# Patient Record
Sex: FEMALE | Race: BLACK OR AFRICAN AMERICAN | NOT HISPANIC OR LATINO | Employment: UNEMPLOYED | URBAN - METROPOLITAN AREA
[De-identification: names, ages, dates, MRNs, and addresses within clinical notes are randomized per-mention and may not be internally consistent; named-entity substitution may affect disease eponyms.]

---

## 2018-05-10 ENCOUNTER — PATIENT OUTREACH (OUTPATIENT)
Dept: ADMINISTRATIVE | Facility: HOSPITAL | Age: 58
End: 2018-05-10

## 2018-05-10 NOTE — LETTER
May 10, 2018    Jenni Rush  [address] [address]             [facility] [address] [address]  Phone: [phone] Dear Ms. Rush     You have an upcoming appointment with Jorge Cody. on 05/25/2018    Your chart is indicating you may be overdue for the following:   Health Maintenance Due   Topic    Pap Smear with HPV Cotest     Mammogram     Lipid Panel     Colonoscopy      Were any of these test, procedures, and/or vaccines performed outside of [facility]?  If so, please let me know when and where so I may request those records and update your chart.    If you would like me to coordinate scheduling any of the recommended test or procedures around the time of your appointment, please feel free to let me know.     Thank you for choosing [facility] for your healthcare needs,    Dee MELARA LPN  Care Coordination Department  [facility]  [phone]

## 2018-05-24 ENCOUNTER — OFFICE VISIT (OUTPATIENT)
Dept: FAMILY MEDICINE | Facility: CLINIC | Age: 58
End: 2018-05-24
Payer: COMMERCIAL

## 2018-05-24 ENCOUNTER — LAB VISIT (OUTPATIENT)
Dept: LAB | Facility: HOSPITAL | Age: 58
End: 2018-05-24
Attending: FAMILY MEDICINE
Payer: COMMERCIAL

## 2018-05-24 VITALS
DIASTOLIC BLOOD PRESSURE: 80 MMHG | RESPIRATION RATE: 17 BRPM | HEIGHT: 62 IN | BODY MASS INDEX: 28.24 KG/M2 | HEART RATE: 94 BPM | OXYGEN SATURATION: 98 % | WEIGHT: 153.44 LBS | SYSTOLIC BLOOD PRESSURE: 146 MMHG | TEMPERATURE: 98 F

## 2018-05-24 DIAGNOSIS — F41.9 ANXIETY DISORDER, UNSPECIFIED TYPE: ICD-10-CM

## 2018-05-24 DIAGNOSIS — Z00.00 ANNUAL PHYSICAL EXAM: ICD-10-CM

## 2018-05-24 DIAGNOSIS — Z78.0 POSTMENOPAUSAL: ICD-10-CM

## 2018-05-24 DIAGNOSIS — Z12.11 COLON CANCER SCREENING: ICD-10-CM

## 2018-05-24 DIAGNOSIS — K59.09 CHRONIC CONSTIPATION: ICD-10-CM

## 2018-05-24 DIAGNOSIS — Z00.00 ANNUAL PHYSICAL EXAM: Primary | ICD-10-CM

## 2018-05-24 DIAGNOSIS — H91.90 DECREASED HEARING, UNSPECIFIED LATERALITY: ICD-10-CM

## 2018-05-24 DIAGNOSIS — R03.0 ELEVATED BLOOD-PRESSURE READING, WITHOUT DIAGNOSIS OF HYPERTENSION: ICD-10-CM

## 2018-05-24 DIAGNOSIS — Z12.31 VISIT FOR SCREENING MAMMOGRAM: ICD-10-CM

## 2018-05-24 LAB
ALBUMIN SERPL BCP-MCNC: 4 G/DL
ALP SERPL-CCNC: 114 U/L
ALT SERPL W/O P-5'-P-CCNC: 14 U/L
ANION GAP SERPL CALC-SCNC: 9 MMOL/L
AST SERPL-CCNC: 20 U/L
BASOPHILS # BLD AUTO: 0.03 K/UL
BASOPHILS NFR BLD: 0.6 %
BILIRUB SERPL-MCNC: 0.3 MG/DL
BILIRUB UR QL STRIP: NEGATIVE
BUN SERPL-MCNC: 13 MG/DL
CALCIUM SERPL-MCNC: 10.5 MG/DL
CHLORIDE SERPL-SCNC: 104 MMOL/L
CHOLEST SERPL-MCNC: 243 MG/DL
CHOLEST/HDLC SERPL: 3.7 {RATIO}
CLARITY UR REFRACT.AUTO: CLEAR
CO2 SERPL-SCNC: 28 MMOL/L
COLOR UR AUTO: YELLOW
CREAT SERPL-MCNC: 0.9 MG/DL
DIFFERENTIAL METHOD: ABNORMAL
EOSINOPHIL # BLD AUTO: 0.1 K/UL
EOSINOPHIL NFR BLD: 2.9 %
ERYTHROCYTE [DISTWIDTH] IN BLOOD BY AUTOMATED COUNT: 13.2 %
EST. GFR  (AFRICAN AMERICAN): >60 ML/MIN/1.73 M^2
EST. GFR  (NON AFRICAN AMERICAN): >60 ML/MIN/1.73 M^2
GLUCOSE SERPL-MCNC: 96 MG/DL
GLUCOSE UR QL STRIP: NEGATIVE
HCT VFR BLD AUTO: 42.7 %
HDLC SERPL-MCNC: 65 MG/DL
HDLC SERPL: 26.7 %
HGB BLD-MCNC: 13 G/DL
HGB UR QL STRIP: NEGATIVE
IMM GRANULOCYTES # BLD AUTO: 0.01 K/UL
IMM GRANULOCYTES NFR BLD AUTO: 0.2 %
KETONES UR QL STRIP: NEGATIVE
LDLC SERPL CALC-MCNC: 156 MG/DL
LEUKOCYTE ESTERASE UR QL STRIP: NEGATIVE
LYMPHOCYTES # BLD AUTO: 1.9 K/UL
LYMPHOCYTES NFR BLD: 39.4 %
MCH RBC QN AUTO: 26.9 PG
MCHC RBC AUTO-ENTMCNC: 30.4 G/DL
MCV RBC AUTO: 88 FL
MICROSCOPIC COMMENT: NORMAL
MONOCYTES # BLD AUTO: 0.4 K/UL
MONOCYTES NFR BLD: 8.4 %
NEUTROPHILS # BLD AUTO: 2.3 K/UL
NEUTROPHILS NFR BLD: 48.5 %
NITRITE UR QL STRIP: NEGATIVE
NONHDLC SERPL-MCNC: 178 MG/DL
NRBC BLD-RTO: 0 /100 WBC
PH UR STRIP: 5 [PH] (ref 5–8)
PLATELET # BLD AUTO: 414 K/UL
PMV BLD AUTO: 9.8 FL
POTASSIUM SERPL-SCNC: 4.1 MMOL/L
PROT SERPL-MCNC: 8.3 G/DL
PROT UR QL STRIP: NEGATIVE
RBC # BLD AUTO: 4.83 M/UL
RBC #/AREA URNS AUTO: 2 /HPF (ref 0–4)
SODIUM SERPL-SCNC: 141 MMOL/L
SP GR UR STRIP: 1.02 (ref 1–1.03)
SQUAMOUS #/AREA URNS AUTO: 0 /HPF
TRIGL SERPL-MCNC: 110 MG/DL
TSH SERPL DL<=0.005 MIU/L-ACNC: 2.65 UIU/ML
URN SPEC COLLECT METH UR: NORMAL
UROBILINOGEN UR STRIP-ACNC: NEGATIVE EU/DL
WBC # BLD AUTO: 4.75 K/UL

## 2018-05-24 PROCEDURE — 99999 PR PBB SHADOW E&M-EST. PATIENT-LVL V: CPT | Mod: PBBFAC,,, | Performed by: FAMILY MEDICINE

## 2018-05-24 PROCEDURE — 81001 URINALYSIS AUTO W/SCOPE: CPT

## 2018-05-24 PROCEDURE — 36415 COLL VENOUS BLD VENIPUNCTURE: CPT | Mod: PO

## 2018-05-24 PROCEDURE — 99386 PREV VISIT NEW AGE 40-64: CPT | Mod: S$GLB,,, | Performed by: FAMILY MEDICINE

## 2018-05-24 PROCEDURE — 85025 COMPLETE CBC W/AUTO DIFF WBC: CPT

## 2018-05-24 PROCEDURE — 84443 ASSAY THYROID STIM HORMONE: CPT

## 2018-05-24 PROCEDURE — 80061 LIPID PANEL: CPT

## 2018-05-24 PROCEDURE — 80053 COMPREHEN METABOLIC PANEL: CPT

## 2018-05-24 RX ORDER — ESCITALOPRAM OXALATE 10 MG/1
10 TABLET ORAL DAILY
Qty: 90 TABLET | Refills: 1 | Status: SHIPPED | OUTPATIENT
Start: 2018-05-24 | End: 2020-03-26

## 2018-05-24 NOTE — LETTER
May 24, 2018      [facility]  [address] [address]  Phone: [phone]       Patient: Jenni Rush   YOB: 1960  Date of Visit: 05/24/2018    To Whom It May Concern:    Carly Rush  was at [facility] on 05/24/2018.She may return to work on 05/25/2018 with no restrictions. If you have any questions or concerns, or if I can be of further assistance, please do not hesitate to contact me.    Sincerely,    Theresa Patel MD

## 2018-05-26 PROBLEM — H91.90 DECREASED HEARING: Status: ACTIVE | Noted: 2018-05-26

## 2018-05-26 PROBLEM — R03.0 ELEVATED BLOOD-PRESSURE READING, WITHOUT DIAGNOSIS OF HYPERTENSION: Status: ACTIVE | Noted: 2018-05-26

## 2018-05-28 ENCOUNTER — CLINICAL SUPPORT (OUTPATIENT)
Dept: AUDIOLOGY | Facility: CLINIC | Age: 58
End: 2018-05-28
Payer: COMMERCIAL

## 2018-05-28 DIAGNOSIS — H91.90 PERCEIVED HEARING LOSS: Primary | ICD-10-CM

## 2018-05-28 DIAGNOSIS — H91.90 DECREASED HEARING, UNSPECIFIED LATERALITY: ICD-10-CM

## 2018-05-28 PROCEDURE — 92557 COMPREHENSIVE HEARING TEST: CPT | Mod: S$GLB,,, | Performed by: AUDIOLOGIST

## 2018-05-28 PROCEDURE — 92567 TYMPANOMETRY: CPT | Mod: S$GLB,,, | Performed by: AUDIOLOGIST

## 2018-05-28 NOTE — PROGRESS NOTES
Jenni Rush was seen 05/28/2018 for an audiological evaluation.  Patient complains of having to ask people to repeat during conversations.  She denies tinnitus and dizziness.    Results reveal normal hearing sensitivity 250-8000 Hz bilaterally.  Speech Reception Thresholds were  5 dBHL for the right ear and 10 dBHL for the left ear.   Word recognition scores were excellent bilaterally.  Tympanograms were Type A for the right ear and Type A for the left ear.    Patient was counseled on the above findings.

## 2018-06-05 ENCOUNTER — INITIAL CONSULT (OUTPATIENT)
Dept: GASTROENTEROLOGY | Facility: CLINIC | Age: 58
End: 2018-06-05
Payer: COMMERCIAL

## 2018-06-05 VITALS
DIASTOLIC BLOOD PRESSURE: 94 MMHG | HEART RATE: 84 BPM | BODY MASS INDEX: 29.01 KG/M2 | HEIGHT: 62 IN | WEIGHT: 157.63 LBS | SYSTOLIC BLOOD PRESSURE: 160 MMHG

## 2018-06-05 DIAGNOSIS — Z12.11 SCREENING FOR COLON CANCER: ICD-10-CM

## 2018-06-05 DIAGNOSIS — K59.04 CHRONIC IDIOPATHIC CONSTIPATION: Primary | ICD-10-CM

## 2018-06-05 PROCEDURE — 3008F BODY MASS INDEX DOCD: CPT | Mod: CPTII,S$GLB,, | Performed by: NURSE PRACTITIONER

## 2018-06-05 PROCEDURE — 99999 PR PBB SHADOW E&M-EST. PATIENT-LVL III: CPT | Mod: PBBFAC,,, | Performed by: NURSE PRACTITIONER

## 2018-06-05 PROCEDURE — 99204 OFFICE O/P NEW MOD 45 MIN: CPT | Mod: S$GLB,,, | Performed by: NURSE PRACTITIONER

## 2018-06-05 RX ORDER — MULTIVITAMIN
TABLET ORAL ONCE
COMMUNITY
End: 2020-03-26

## 2018-06-05 RX ORDER — DOCUSATE SODIUM 100 MG/1
100 CAPSULE, LIQUID FILLED ORAL 2 TIMES DAILY
COMMUNITY
End: 2018-08-24

## 2018-06-05 RX ORDER — SODIUM, POTASSIUM,MAG SULFATES 17.5-3.13G
1 SOLUTION, RECONSTITUTED, ORAL ORAL ONCE
Qty: 1 BOTTLE | Refills: 0 | Status: SHIPPED | OUTPATIENT
Start: 2018-06-05 | End: 2018-06-05

## 2018-06-05 RX ORDER — METHOCARBAMOL 750 MG/1
500 TABLET, FILM COATED ORAL 2 TIMES DAILY PRN
COMMUNITY
End: 2018-07-26

## 2018-06-05 RX ORDER — VIT C/E/ZN/COPPR/LUTEIN/ZEAXAN 250MG-90MG
1000 CAPSULE ORAL DAILY
COMMUNITY
End: 2020-03-26

## 2018-06-05 RX ORDER — IBUPROFEN 600 MG/1
600 TABLET ORAL 2 TIMES DAILY
COMMUNITY
End: 2018-08-24

## 2018-06-05 RX ORDER — VALACYCLOVIR HYDROCHLORIDE 1 G/1
1000 TABLET, FILM COATED ORAL 2 TIMES DAILY
COMMUNITY
End: 2020-03-26

## 2018-06-05 RX ORDER — POLYETHYLENE GLYCOL 3350 17 G/17G
17 POWDER, FOR SOLUTION ORAL DAILY
COMMUNITY
End: 2018-08-24

## 2018-06-05 RX ORDER — MINOCYCLINE HYDROCHLORIDE 50 MG/1
100 CAPSULE ORAL
COMMUNITY
End: 2020-03-26

## 2018-06-05 RX ORDER — LUBIPROSTONE 24 UG/1
24 CAPSULE ORAL 2 TIMES DAILY WITH MEALS
Qty: 60 CAPSULE | Refills: 2 | Status: SHIPPED | OUTPATIENT
Start: 2018-06-05 | End: 2020-03-26

## 2018-06-05 NOTE — PROGRESS NOTES
Clinic Consult:  [facility] Consultation Note    Reason for Consult:  The primary encounter diagnosis was Chronic idiopathic constipation. A diagnosis of Screening for colon cancer was also pertinent to this visit.    PCP: Primary Doctor No   [address] [place] [address]    HPI:  This is a 57 y.o. female here for evaluation of the above  Pt reports a history of chronic constipation since childhood.  The constipation has recently worsened. She has been taking OTC enemas with Miralax and stool softeners without any significant relief.  Has a BM once weekly even with taking these medications.  Has tried OTC mag citrate without improvement.   She has occasional abdominal pain with the constipation.  Has associated bloating.   No melena or hematochezia.  No weight loss.   Has never had a colonoscopy for screening.     Review of Systems   Constitutional: Negative for chills, fever, malaise/fatigue and weight loss.   Respiratory: Negative for cough.    Cardiovascular: Negative for chest pain.   Gastrointestinal:        Per HPI   Musculoskeletal: Negative for myalgias.   Skin: Negative for itching and rash.   Neurological: Negative for headaches.   Psychiatric/Behavioral: The patient is not nervous/anxious.        Medical History:   Past Medical History:   Diagnosis Date    Elevated blood pressure reading without diagnosis of hypertension     History of abnormal cervical Pap smear     x 1 with repeat normal - thus follow routine pap screening    Postmenopausal        Surgical History:  Past Surgical History:   Procedure Laterality Date    None         Family History:   Family History   Problem Relation Age of Onset    Hypertension Mother     Anxiety disorder Mother     Hyperlipidemia Mother     Heart attack Father     Coronary artery disease Brother     Stomach cancer Paternal Grandfather     No Known Problems Brother     Anxiety disorder Daughter     No Known Problems Son      "Diabetes Paternal Aunt     Thyroid disease Neg Hx        Social History:   Social History   Substance Use Topics    Smoking status: Never Smoker    Smokeless tobacco: Never Used    Alcohol use 0.6 oz/week     1 Glasses of wine per week       Allergies: Reviewed    Home Medications:   Current Outpatient Prescriptions on File Prior to Visit   Medication Sig Dispense Refill    escitalopram oxalate (LEXAPRO) 10 MG tablet Take 1 tablet (10 mg total) by mouth once daily. 90 tablet 1     No current facility-administered medications on file prior to visit.        Physical Exam:  Vital Signs:  BP (!) 160/94   Pulse 84   Ht 5' 2" (1.575 m)   Wt 71.5 kg (157 lb 10.1 oz)   BMI 28.83 kg/m²   Body mass index is 28.83 kg/m².  Physical Exam   Constitutional: She is oriented to person, place, and time. She appears well-developed and well-nourished.   HENT:   Head: Normocephalic.   Eyes: No scleral icterus.   Neck: Normal range of motion.   Cardiovascular: Normal rate and regular rhythm.    Pulmonary/Chest: Effort normal and breath sounds normal.   Abdominal: Soft. Bowel sounds are normal. She exhibits no distension. There is no tenderness.   Musculoskeletal: Normal range of motion.   Neurological: She is alert and oriented to person, place, and time.   Skin: Skin is warm and dry.   Psychiatric: She has a normal mood and affect.   Vitals reviewed.      Labs: Pertinent labs reviewed.      Assessment:  1. Chronic idiopathic constipation    2. Screening for colon cancer         Recommendations:  Chronic idiopathic constipation  - increase water and dietary fiber  - Trial of Amitiza BID for the constipation given that she has tried multiple OTC medication without improvement.   -     lubiprostone (AMITIZA) 24 MCG Cap; Take 1 capsule (24 mcg total) by mouth 2 (two) times daily with meals.  Dispense: 60 capsule; Refill: 2    Screening for colon cancer  - Plan for colonoscopy with Suprep  - Encouraged 2 days of clear liquids for " better prep  -     Case request GI: COLONOSCOPY  -     sodium,potassium,mag sulfates (SUPREP BOWEL PREP KIT) 17.5-3.13-1.6 gram SolR; Take 354 mLs by mouth once.  Dispense: 1 Bottle; Refill: 0          F/U 2-3 weeks after colonoscopy       Thank you so much for allowing me to participate in the care of Jenni Mendez, FNP-C

## 2018-06-05 NOTE — LETTER
June 5, 2018      Theresa Patel MD  [address] [place] [address]           [facility]  [address] [place] [address]  Phone: [phone]  Fax: [phone]          Patient: Jenni Rush   MR Number: [id number]   YOB: 1960   Date of Visit: 6/5/2018       Dear Dr. Theresa Patel:    Thank you for referring Jenni Rush to me for evaluation. Attached you will find relevant portions of my assessment and plan of care.    If you have questions, please do not hesitate to call me. I look forward to following Jenni Rush along with you.    Sincerely,    Janet Mendez, [facility]    Enclosure  CC:  No Recipients    If you would like to receive this communication electronically, please contact [email or web address] or [phone] to request more information on [facility] [employer] access.    For providers and/or their staff who would like to refer a patient to [facility], please contact us through our one-stop-shop provider referral line, [facility], at [phone].    If you feel you have received this communication in error or would no longer like to receive these types of communications, please e-mail [email or web address]

## 2018-06-12 ENCOUNTER — SURGERY (OUTPATIENT)
Age: 58
End: 2018-06-12

## 2018-06-12 ENCOUNTER — HOSPITAL ENCOUNTER (OUTPATIENT)
Facility: HOSPITAL | Age: 58
Discharge: HOME | End: 2018-06-12
Attending: INTERNAL MEDICINE | Admitting: INTERNAL MEDICINE
Payer: COMMERCIAL

## 2018-06-12 ENCOUNTER — ANESTHESIA EVENT (OUTPATIENT)
Dept: ENDOSCOPY | Facility: HOSPITAL | Age: 58
End: 2018-06-12
Payer: COMMERCIAL

## 2018-06-12 ENCOUNTER — ANESTHESIA (OUTPATIENT)
Dept: ENDOSCOPY | Facility: HOSPITAL | Age: 58
End: 2018-06-12
Payer: COMMERCIAL

## 2018-06-12 VITALS
HEIGHT: 62 IN | WEIGHT: 153 LBS | HEART RATE: 78 BPM | SYSTOLIC BLOOD PRESSURE: 119 MMHG | BODY MASS INDEX: 28.16 KG/M2 | DIASTOLIC BLOOD PRESSURE: 73 MMHG | OXYGEN SATURATION: 100 % | RESPIRATION RATE: 18 BRPM | TEMPERATURE: 98 F

## 2018-06-12 DIAGNOSIS — Z12.11 COLON CANCER SCREENING: ICD-10-CM

## 2018-06-12 PROCEDURE — 37000008 HC ANESTHESIA 1ST 15 MINUTES: Performed by: INTERNAL MEDICINE

## 2018-06-12 PROCEDURE — 37000009 HC ANESTHESIA EA ADD 15 MINS: Performed by: INTERNAL MEDICINE

## 2018-06-12 PROCEDURE — G0121 COLON CA SCRN NOT HI RSK IND: HCPCS | Performed by: INTERNAL MEDICINE

## 2018-06-12 PROCEDURE — G0121 COLON CA SCRN NOT HI RSK IND: HCPCS | Mod: ,,, | Performed by: INTERNAL MEDICINE

## 2018-06-12 PROCEDURE — 25000003 PHARM REV CODE 250: Performed by: NURSE ANESTHETIST, CERTIFIED REGISTERED

## 2018-06-12 PROCEDURE — 63600175 PHARM REV CODE 636 W HCPCS: Performed by: NURSE ANESTHETIST, CERTIFIED REGISTERED

## 2018-06-12 PROCEDURE — 25000003 PHARM REV CODE 250: Performed by: INTERNAL MEDICINE

## 2018-06-12 RX ORDER — LIDOCAINE HYDROCHLORIDE 10 MG/ML
INJECTION INFILTRATION; PERINEURAL
Status: DISCONTINUED | OUTPATIENT
Start: 2018-06-12 | End: 2018-06-12

## 2018-06-12 RX ORDER — SODIUM CHLORIDE, SODIUM LACTATE, POTASSIUM CHLORIDE, CALCIUM CHLORIDE 600; 310; 30; 20 MG/100ML; MG/100ML; MG/100ML; MG/100ML
INJECTION, SOLUTION INTRAVENOUS CONTINUOUS
Status: DISCONTINUED | OUTPATIENT
Start: 2018-06-12 | End: 2018-06-12 | Stop reason: HOSPADM

## 2018-06-12 RX ORDER — PROPOFOL 10 MG/ML
VIAL (ML) INTRAVENOUS
Status: DISCONTINUED | OUTPATIENT
Start: 2018-06-12 | End: 2018-06-12

## 2018-06-12 RX ADMIN — PROPOFOL 50 MG: 10 INJECTION, EMULSION INTRAVENOUS at 08:06

## 2018-06-12 RX ADMIN — LIDOCAINE HYDROCHLORIDE 50 MG: 10 INJECTION, SOLUTION INFILTRATION; PERINEURAL at 08:06

## 2018-06-12 RX ADMIN — PROPOFOL 25 MG: 10 INJECTION, EMULSION INTRAVENOUS at 08:06

## 2018-06-12 RX ADMIN — SODIUM CHLORIDE, SODIUM LACTATE, POTASSIUM CHLORIDE, AND CALCIUM CHLORIDE: .6; .31; .03; .02 INJECTION, SOLUTION INTRAVENOUS at 07:06

## 2018-06-12 NOTE — ANESTHESIA POSTPROCEDURE EVALUATION
"Anesthesia Post Evaluation    Patient: Jenni Rush    Procedure(s) Performed: Procedure(s) (LRB):  COLONOSCOPY (N/A)    Final Anesthesia Type: MAC  Patient location during evaluation: GI PACU  Patient participation: Yes- Able to Participate  Level of consciousness: awake and alert and oriented  Post-procedure vital signs: reviewed and stable  Pain management: adequate  Airway patency: patent  PONV status at discharge: No PONV  Anesthetic complications: no      Cardiovascular status: blood pressure returned to baseline  Respiratory status: unassisted, room air and spontaneous ventilation  Hydration status: euvolemic  Follow-up not needed.        Visit Vitals  /73 (BP Location: Left arm, Patient Position: Lying)   Pulse 78   Temp 36.5 °C (97.7 °F) (Oral)   Resp 18   Ht 5' 2" (1.575 m)   Wt 69.4 kg (153 lb)   SpO2 100%   Breastfeeding? No   BMI 27.98 kg/m²       Pain/Ant Score: Pain Assessment Performed: Yes (6/12/2018  7:21 AM)  Presence of Pain: denies (6/12/2018  8:57 AM)  Ant Score: 10 (6/12/2018  8:57 AM)      "

## 2018-06-12 NOTE — INTERVAL H&P NOTE
The patient has been examined and the H&P has been reviewed:I have reviewed this note and I agree with this assessment. The patient was seen in the GI office and remains stable for endoscopy at the time of this present evaluation.         Anesthesia/Surgery risks, benefits and alternative options discussed and understood by patient/family.          Active Hospital Problems    Diagnosis  POA    Colon cancer screening [Z12.11]  Not Applicable      Resolved Hospital Problems    Diagnosis Date Resolved POA   No resolved problems to display.

## 2018-06-12 NOTE — DISCHARGE INSTRUCTIONS
Colonoscopy     A camera attached to a flexible tube with a viewing lens is used to take video pictures.     Colonoscopy is a test to view the inside of your lower digestive tract (colon and rectum). Sometimes it can show the last part of the small intestine (ileum). During the test, small pieces of tissue may be removed for testing. This is called a biopsy. Small growths, such as polyps, may also be removed.   Why is colonoscopy done?  The test is done to help look for colon cancer. And it can help find the source of abdominal pain, bleeding, and changes in bowel habits. It may be needed once a year, depending on factors such as your:  · Age  · Health history  · Family health history  · Symptoms  · Results from any prior colonoscopy  Risks and possible complications  These include:  · Bleeding               · A puncture or tear in the colon   · Risks of anesthesia  · A cancer lesion not being seen  Getting ready   To prepare for the test:  · Talk with your healthcare provider about the risks of the test (see below). Also ask your healthcare provider about alternatives to the test.  · Tell your healthcare provider about any medicines you take. Also tell him or her about any health conditions you may have.  · Make sure your rectum and colon are empty for the test. Follow the diet and bowel prep instructions exactly. If you dont, the test may need to be rescheduled.  · Plan for a friend or family member to drive you home after the test.     Colonoscopy provides an inside view of the entire colon.     You may discuss the results with your doctor right away or at a future visit.  During the test   The test is usually done in the hospital on an outpatient basis. This means you go home the same day. The procedure takes about 30 minutes. During that time:  · You are given relaxing (sedating) medicine through an IV line. You may be drowsy, or fully asleep.  · The healthcare provider will first give you a physical exam to  check for anal and rectal problems.  · Then the anus is lubricated and the scope inserted.  · If you are awake, you may have a feeling similar to needing to have a bowel movement. You may also feel pressure as air is pumped into the colon. Its OK to pass gas during the procedure.  · Biopsy, polyp removal, or other treatments may be done during the test.  After the test   You may have gas right after the test. It can help to try to pass it to help prevent later bloating. Your healthcare provider may discuss the results with you right away. Or you may need to schedule a follow-up visit to talk about the results. After the test, you can go back to your normal eating and other activities. You may be tired from the sedation and need to rest for a few hours.  Date Last Reviewed: 11/1/2016 © 2000-2017 [employer]. [address] [place] [address], [place] [address]. All rights reserved. This information is not intended as a substitute for professional medical care. Always follow your healthcare professional's instructions.

## 2018-06-12 NOTE — DISCHARGE SUMMARY
[facility]  Brief Operative Note     SUMMARY     Surgery Date: 6/12/2018     Surgeon(s) and Role:     * Carl Mora III, MD - Primary    Assisting Surgeon: None    Pre-op Diagnosis:  Screening for colon cancer [Z12.11]    Post-op Diagnosis:  Post-Op Diagnosis Codes:     * Screening for colon cancer [Z12.11]    Procedure(s) (LRB):  COLONOSCOPY (N/A)    Anesthesia: Choice    Description of the findings of the procedure: Procedures completed. See Procedure note for full details.    Findings/Key Components: Procedures completed. See Procedure note for full details.    Prosthetics/Devices: None    Estimated Blood Loss: * No values recorded between 6/12/2018 12:00 AM and 6/12/2018  8:50 AM *         Specimens:   Specimen (12h ago through future)    None          Discharge Note    SUMMARY     Admit Date: 6/12/2018    Discharge Date and Time: 6/12/2018    Hospital Course (synopsis of major diagnoses, care, treatment, and services provided during the course of the hospital stay):  Procedures completed. See Procedure note for full details. Discharge patient when discharge criteria met.    Final Diagnosis: Post-Op Diagnosis Codes:     * Screening for colon cancer [Z12.11]    Disposition: Discharge patient when discharge criteria met.    Follow Up/Patient Instructions:       Medications:  Reconciled Home Medications: Current Discharge Medication List      CONTINUE these medications which have NOT CHANGED    Details   calcium-vitamin D (CALCIUM WITH VITAMIN D) 600 mg(1,500mg) -400 unit Tab Take by mouth once.      cholecalciferol, vitamin D3, (VITAMIN D3) 1,000 unit capsule Take 1,000 Units by mouth once daily.      docusate sodium (COLACE) 100 MG capsule Take 100 mg by mouth 2 (two) times daily.      escitalopram oxalate (LEXAPRO) 10 MG tablet Take 1 tablet (10 mg total) by mouth once daily.  Qty: 90 tablet, Refills: 1    Associated Diagnoses: Anxiety disorder, unspecified type      ibuprofen  (ADVIL,MOTRIN) 600 MG tablet Take 600 mg by mouth 2 (two) times daily.      Lactobacillus rhamnosus GG (CULTURELLE) 10 billion cell capsule Take 1 capsule by mouth once daily.      methocarbamol (ROBAXIN) 750 MG Tab Take 500 mg by mouth 2 (two) times daily as needed.      minocycline (MINOCIN,DYNACIN) 50 MG Cap Take 100 mg by mouth every 12 (twelve) hours.      multivit/iron/FA/K/herb no.244 (ALIVE WOMEN'S ENERGY ORAL) Take by mouth.      polyethylene glycol (GLYCOLAX) 17 gram/dose powder Take 17 g by mouth once daily.      UNABLE TO FIND medication name: Daily Essential Enzymes OTC- 1 daily      lubiprostone (AMITIZA) 24 MCG Cap Take 1 capsule (24 mcg total) by mouth 2 (two) times daily with meals.  Qty: 60 capsule, Refills: 2    Associated Diagnoses: Chronic idiopathic constipation      valACYclovir (VALTREX) 1000 MG tablet Take 1,000 mg by mouth 2 (two) times daily.              Discharge Procedure Orders  Diet general     Diet general     Activity as tolerated     Activity as tolerated

## 2018-06-12 NOTE — H&P (VIEW-ONLY)
Clinic Consult:  [facility] Consultation Note    Reason for Consult:  The primary encounter diagnosis was Chronic idiopathic constipation. A diagnosis of Screening for colon cancer was also pertinent to this visit.    PCP: Primary Doctor No   [address] [place] [address]    HPI:  This is a 57 y.o. female here for evaluation of the above  Pt reports a history of chronic constipation since childhood.  The constipation has recently worsened. She has been taking OTC enemas with Miralax and stool softeners without any significant relief.  Has a BM once weekly even with taking these medications.  Has tried OTC mag citrate without improvement.   She has occasional abdominal pain with the constipation.  Has associated bloating.   No melena or hematochezia.  No weight loss.   Has never had a colonoscopy for screening.     Review of Systems   Constitutional: Negative for chills, fever, malaise/fatigue and weight loss.   Respiratory: Negative for cough.    Cardiovascular: Negative for chest pain.   Gastrointestinal:        Per HPI   Musculoskeletal: Negative for myalgias.   Skin: Negative for itching and rash.   Neurological: Negative for headaches.   Psychiatric/Behavioral: The patient is not nervous/anxious.        Medical History:   Past Medical History:   Diagnosis Date    Elevated blood pressure reading without diagnosis of hypertension     History of abnormal cervical Pap smear     x 1 with repeat normal - thus follow routine pap screening    Postmenopausal        Surgical History:  Past Surgical History:   Procedure Laterality Date    None         Family History:   Family History   Problem Relation Age of Onset    Hypertension Mother     Anxiety disorder Mother     Hyperlipidemia Mother     Heart attack Father     Coronary artery disease Brother     Stomach cancer Paternal Grandfather     No Known Problems Brother     Anxiety disorder Daughter     No Known Problems Son      "Diabetes Paternal Aunt     Thyroid disease Neg Hx        Social History:   Social History   Substance Use Topics    Smoking status: Never Smoker    Smokeless tobacco: Never Used    Alcohol use 0.6 oz/week     1 Glasses of wine per week       Allergies: Reviewed    Home Medications:   Current Outpatient Prescriptions on File Prior to Visit   Medication Sig Dispense Refill    escitalopram oxalate (LEXAPRO) 10 MG tablet Take 1 tablet (10 mg total) by mouth once daily. 90 tablet 1     No current facility-administered medications on file prior to visit.        Physical Exam:  Vital Signs:  BP (!) 160/94   Pulse 84   Ht 5' 2" (1.575 m)   Wt 71.5 kg (157 lb 10.1 oz)   BMI 28.83 kg/m²   Body mass index is 28.83 kg/m².  Physical Exam   Constitutional: She is oriented to person, place, and time. She appears well-developed and well-nourished.   HENT:   Head: Normocephalic.   Eyes: No scleral icterus.   Neck: Normal range of motion.   Cardiovascular: Normal rate and regular rhythm.    Pulmonary/Chest: Effort normal and breath sounds normal.   Abdominal: Soft. Bowel sounds are normal. She exhibits no distension. There is no tenderness.   Musculoskeletal: Normal range of motion.   Neurological: She is alert and oriented to person, place, and time.   Skin: Skin is warm and dry.   Psychiatric: She has a normal mood and affect.   Vitals reviewed.      Labs: Pertinent labs reviewed.      Assessment:  1. Chronic idiopathic constipation    2. Screening for colon cancer         Recommendations:  Chronic idiopathic constipation  - increase water and dietary fiber  - Trial of Amitiza BID for the constipation given that she has tried multiple OTC medication without improvement.   -     lubiprostone (AMITIZA) 24 MCG Cap; Take 1 capsule (24 mcg total) by mouth 2 (two) times daily with meals.  Dispense: 60 capsule; Refill: 2    Screening for colon cancer  - Plan for colonoscopy with Suprep  - Encouraged 2 days of clear liquids for " better prep  -     Case request GI: COLONOSCOPY  -     sodium,potassium,mag sulfates (SUPREP BOWEL PREP KIT) 17.5-3.13-1.6 gram SolR; Take 354 mLs by mouth once.  Dispense: 1 Bottle; Refill: 0          F/U 2-3 weeks after colonoscopy       Thank you so much for allowing me to participate in the care of Jenni Mendez, FNP-C

## 2018-06-12 NOTE — TRANSFER OF CARE
"Anesthesia Transfer of Care Note    Patient: Jenni Rush    Procedure(s) Performed: Procedure(s) (LRB):  COLONOSCOPY (N/A)    Patient location: GI    Anesthesia Type: MAC    Transport from OR: Transported from OR on room air with adequate spontaneous ventilation    Post pain: adequate analgesia    Post assessment: no apparent anesthetic complications    Post vital signs: stable    Level of consciousness: awake, alert and oriented    Nausea/Vomiting: no nausea/vomiting    Complications: none    Transfer of care protocol was followed      Last vitals:   Visit Vitals  /78 (BP Location: Left arm, Patient Position: Lying)   Pulse 92   Temp 36.5 °C (97.7 °F) (Oral)   Resp 18   Ht 5' 2" (1.575 m)   Wt 69.4 kg (153 lb)   SpO2 100%   Breastfeeding? No   BMI 27.98 kg/m²     "

## 2018-06-12 NOTE — ANESTHESIA RELEASE NOTE
"Anesthesia Release from PACU Note    Patient: Jenni Rush    Procedure(s) Performed: Procedure(s) (LRB):  COLONOSCOPY (N/A)    Anesthesia type: MAC    Post pain: Adequate analgesia    Post assessment: no apparent anesthetic complications, tolerated procedure well and no evidence of recall    Last Vitals:   Visit Vitals  /73 (BP Location: Left arm, Patient Position: Lying)   Pulse 78   Temp 36.5 °C (97.7 °F) (Oral)   Resp 18   Ht 5' 2" (1.575 m)   Wt 69.4 kg (153 lb)   SpO2 100%   Breastfeeding? No   BMI 27.98 kg/m²       Post vital signs: stable    Level of consciousness: awake, alert  and oriented    Nausea/Vomiting: no nausea/no vomiting    Complications: none    Airway Patency: patent    Respiratory: unassisted, spontaneous ventilation, room air    Cardiovascular: stable and blood pressure at baseline    Hydration: euvolemic  "

## 2018-06-18 ENCOUNTER — HOSPITAL ENCOUNTER (OUTPATIENT)
Dept: RADIOLOGY | Facility: HOSPITAL | Age: 58
Discharge: HOME | End: 2018-06-18
Attending: FAMILY MEDICINE
Payer: COMMERCIAL

## 2018-06-18 VITALS — BODY MASS INDEX: 28.16 KG/M2 | HEIGHT: 62 IN | WEIGHT: 153 LBS

## 2018-06-18 DIAGNOSIS — Z12.31 VISIT FOR SCREENING MAMMOGRAM: ICD-10-CM

## 2018-06-18 PROCEDURE — 77067 SCR MAMMO BI INCL CAD: CPT | Mod: 26,,, | Performed by: RADIOLOGY

## 2018-06-18 PROCEDURE — 77067 SCR MAMMO BI INCL CAD: CPT | Mod: TC,PO

## 2018-06-18 PROCEDURE — 77063 BREAST TOMOSYNTHESIS BI: CPT | Mod: 26,,, | Performed by: RADIOLOGY

## 2018-07-23 ENCOUNTER — TELEPHONE (OUTPATIENT)
Dept: FAMILY MEDICINE | Facility: CLINIC | Age: 58
End: 2018-07-23

## 2018-07-23 NOTE — TELEPHONE ENCOUNTER
----- Message from Mari Ames sent at 7/23/2018  1:06 PM CDT -----  Contact: pt  Pt stated she calling for a referral to see an orthopedic doctor, she can be reached at [phone] Thanks

## 2018-07-26 ENCOUNTER — TELEPHONE (OUTPATIENT)
Dept: FAMILY MEDICINE | Facility: CLINIC | Age: 58
End: 2018-07-26

## 2018-07-26 ENCOUNTER — OFFICE VISIT (OUTPATIENT)
Dept: FAMILY MEDICINE | Facility: CLINIC | Age: 58
End: 2018-07-26
Payer: COMMERCIAL

## 2018-07-26 VITALS
DIASTOLIC BLOOD PRESSURE: 76 MMHG | OXYGEN SATURATION: 98 % | SYSTOLIC BLOOD PRESSURE: 130 MMHG | WEIGHT: 155.63 LBS | HEART RATE: 118 BPM | BODY MASS INDEX: 28.64 KG/M2 | HEIGHT: 62 IN | TEMPERATURE: 97 F

## 2018-07-26 DIAGNOSIS — M54.42 ACUTE MIDLINE LOW BACK PAIN WITH LEFT-SIDED SCIATICA: Primary | ICD-10-CM

## 2018-07-26 PROCEDURE — 3008F BODY MASS INDEX DOCD: CPT | Mod: CPTII,S$GLB,, | Performed by: FAMILY MEDICINE

## 2018-07-26 PROCEDURE — 99999 PR PBB SHADOW E&M-EST. PATIENT-LVL III: CPT | Mod: PBBFAC,,, | Performed by: FAMILY MEDICINE

## 2018-07-26 PROCEDURE — 99214 OFFICE O/P EST MOD 30 MIN: CPT | Mod: S$GLB,,, | Performed by: FAMILY MEDICINE

## 2018-07-26 NOTE — PROGRESS NOTES
Jenni Rush    Chief Complaint   Patient presents with    Back Pain       History of Present Illness:   Ms. Rush comes in today with complaint of having mid back to left buttock and left inguinal area pain for 2 weeks.  She states pain is constant and 80-9/10 on pain scale now but 10/10 on pain scale at worse.  She reports having years of low back pain with occasional flare but states pain is different now.  She reports having numbness without associated pain at her left thigh.  She reports pain with walking and states she cannot walk long distances or stand for long periods of time due to pain.  She denies bowel or bladder incontinence; abdominal pain, nausea, vomiting, diarrhea, constipation; shortness of breath, cough, wheezing; chest pain, palpitations, leg swelling; fever, chills, fatigue, change of appetite; unusual urinary symptoms; headaches; recent trauma or heavy lifting.  She states she takes ibuprofen without help.    She states she has been previously followed by Dr. Jericho Zapata, orthopedist, for surveillance and treatment of low back pain with help with injections.  She states she currently follows with Dr. Dereck Bliss, orthopedist, for surveillance and treatment of chronic neck and upper back pain.    She is a [employer]  and states her job sent her to [employer] [facility] [employer] [facility] where she was evaluated on July 23, 2018 and states she was told she needs to see orthopedist for further evaluation and not to return to work due to risks associated with pain and was given off until she follows with orthopedist.  Prior to that, she was evaluated at [facility] on July 18, 2018 for back pain without back x-rays performed and was given Norco 5-325 mg every 4-6 hours prn pain, #15, along with Medrol Dosepak which she states she completed yesterday.          Current Outpatient Prescriptions   Medication Sig    calcium-vitamin D (CALCIUM WITH VITAMIN D) 600 mg(1,500mg) -400 unit  Tab Take by mouth once.    cholecalciferol, vitamin D3, (VITAMIN D3) 1,000 unit capsule Take 1,000 Units by mouth once daily.    docusate sodium (COLACE) 100 MG capsule Take 100 mg by mouth 2 (two) times daily.    escitalopram oxalate (LEXAPRO) 10 MG tablet Take 1 tablet (10 mg total) by mouth once daily.    ibuprofen (ADVIL,MOTRIN) 600 MG tablet Take 600 mg by mouth 2 (two) times daily.    Lactobacillus rhamnosus GG (CULTURELLE) 10 billion cell capsule Take 1 capsule by mouth once daily.    lubiprostone (AMITIZA) 24 MCG Cap Take 1 capsule (24 mcg total) by mouth 2 (two) times daily with meals.    minocycline (MINOCIN,DYNACIN) 50 MG Cap Take 100 mg by mouth every 12 (twelve) hours.    multivit/iron/FA/K/herb no.244 (ALIVE WOMEN'S ENERGY ORAL) Take by mouth.    polyethylene glycol (GLYCOLAX) 17 gram/dose powder Take 17 g by mouth once daily.    valACYclovir (VALTREX) 1000 MG tablet Take 1,000 mg by mouth 2 (two) times daily.         Review of Systems   Constitutional: Negative for appetite change, chills, fatigue and fever.   Respiratory: Negative for cough, shortness of breath and wheezing.    Cardiovascular: Negative for chest pain, palpitations and leg swelling.   Gastrointestinal: Negative for abdominal pain, constipation, diarrhea, nausea and vomiting.   Genitourinary: Negative for difficulty urinating.   Musculoskeletal: Positive for back pain. Negative for gait problem.   Neurological: Positive for numbness. Negative for headaches.   Psychiatric/Behavioral: Negative for dysphoric mood. The patient is not nervous/anxious.        Objective:  Physical Exam   Constitutional: She is oriented to person, place, and time. She appears well-developed and well-nourished. No distress.   Pleasant.   Cardiovascular: Normal rate, regular rhythm, normal heart sounds and intact distal pulses.    No murmur heard.  Pulmonary/Chest: Effort normal and breath sounds normal. No respiratory distress. She has no wheezes. She  has no rales.   Abdominal: Soft. Bowel sounds are normal. She exhibits no distension and no mass. There is no tenderness. There is no guarding.   Musculoskeletal: Normal range of motion. She exhibits tenderness. She exhibits no edema.   She is ambulatory without problems.  Tender at mild lumbar, left buttock, left thigh with full range of motion noted.  Negative straight leg raises bilaterally.   Neurological: She is alert and oriented to person, place, and time. She displays normal reflexes.   Skin: She is not diaphoretic.   Psychiatric: She has a normal mood and affect. Her behavior is normal. Judgment and thought content normal.   Vitals reviewed.      ASSESSMENT:  1. Acute midline low back pain with left-sided sciatica        PLAN:  Jenni was seen today for back pain.    Diagnoses and all orders for this visit:    Acute midline low back pain with left-sided sciatica  -     Ambulatory Referral to Physiatry    Continue current medications (including muscle relaxant which she states she has already to take), follow low sodium, low cholesterol, low carb diet, daily walks.  Follow-up if symptoms worsen or fail to improve.

## 2018-07-31 PROBLEM — M54.42 ACUTE MIDLINE LOW BACK PAIN WITH LEFT-SIDED SCIATICA: Status: ACTIVE | Noted: 2018-07-31

## 2018-08-24 ENCOUNTER — OFFICE VISIT (OUTPATIENT)
Dept: FAMILY MEDICINE | Facility: CLINIC | Age: 58
End: 2018-08-24
Payer: COMMERCIAL

## 2018-08-24 VITALS
HEIGHT: 62 IN | TEMPERATURE: 98 F | OXYGEN SATURATION: 98 % | BODY MASS INDEX: 28.97 KG/M2 | HEART RATE: 136 BPM | SYSTOLIC BLOOD PRESSURE: 120 MMHG | WEIGHT: 157.44 LBS | DIASTOLIC BLOOD PRESSURE: 80 MMHG

## 2018-08-24 DIAGNOSIS — R00.0 TACHYCARDIA: Primary | ICD-10-CM

## 2018-08-24 DIAGNOSIS — R03.0 ELEVATED BLOOD-PRESSURE READING, WITHOUT DIAGNOSIS OF HYPERTENSION: ICD-10-CM

## 2018-08-24 PROCEDURE — 99214 OFFICE O/P EST MOD 30 MIN: CPT | Mod: S$GLB,,, | Performed by: FAMILY MEDICINE

## 2018-08-24 PROCEDURE — 3008F BODY MASS INDEX DOCD: CPT | Mod: CPTII,S$GLB,, | Performed by: FAMILY MEDICINE

## 2018-08-24 PROCEDURE — 99999 PR PBB SHADOW E&M-EST. PATIENT-LVL III: CPT | Mod: PBBFAC,,, | Performed by: FAMILY MEDICINE

## 2018-08-24 NOTE — PROGRESS NOTES
Subjective:       Patient ID: Jenni Rush is a 58 y.o. female.    Chief Complaint: Hypertension      HPI   Ms. Rush presents to clinic for concern of hypertension.   She states she went to dentist this morning for deep cleaning and her blood pressure was 151/90.   She states this is the second time her blood pressure was elevated and she could not be seen.   She states she did not feel nervous.   Her heart rate is elevated today. She denies any palpitations.   She does have chronic back pain.   On review of chart, she was tachycardic with her lasts PCP appointment.       Review of Systems   Constitutional: Negative for fever.   Respiratory: Negative for cough and shortness of breath.    Cardiovascular: Negative for chest pain.   Gastrointestinal: Negative for abdominal pain and vomiting.   Musculoskeletal: Positive for back pain.   Psychiatric/Behavioral: The patient is not nervous/anxious.        Medication List with Changes/Refills   Current Medications    CALCIUM-VITAMIN D (CALCIUM WITH VITAMIN D) 600 MG(1,500MG) -400 UNIT TAB    Take by mouth once.    CHOLECALCIFEROL, VITAMIN D3, (VITAMIN D3) 1,000 UNIT CAPSULE    Take 1,000 Units by mouth once daily.    ESCITALOPRAM OXALATE (LEXAPRO) 10 MG TABLET    Take 1 tablet (10 mg total) by mouth once daily.    LACTOBACILLUS RHAMNOSUS GG (CULTURELLE) 10 BILLION CELL CAPSULE    Take 1 capsule by mouth once daily.    LUBIPROSTONE (AMITIZA) 24 MCG CAP    Take 1 capsule (24 mcg total) by mouth 2 (two) times daily with meals.    MINOCYCLINE (MINOCIN,DYNACIN) 50 MG CAP    Take 100 mg by mouth every 12 (twelve) hours.    MULTIVIT/IRON/FA/K/HERB NO.244 (ALIVE WOMEN'S ENERGY ORAL)    Take by mouth.    VALACYCLOVIR (VALTREX) 1000 MG TABLET    Take 1,000 mg by mouth 2 (two) times daily.   Discontinued Medications    DOCUSATE SODIUM (COLACE) 100 MG CAPSULE    Take 100 mg by mouth 2 (two) times daily.    IBUPROFEN (ADVIL,MOTRIN) 600 MG TABLET    Take 600 mg by mouth 2  (two) times daily.    POLYETHYLENE GLYCOL (GLYCOLAX) 17 GRAM/DOSE POWDER    Take 17 g by mouth once daily.       Patient Active Problem List   Diagnosis    Chronic constipation    Postmenopausal    Anxiety disorder    Decreased hearing    Elevated blood-pressure reading, without diagnosis of hypertension    Colon cancer screening    Acute midline low back pain with left-sided sciatica         Objective:     Physical Exam   Constitutional: She is oriented to person, place, and time. She appears well-developed and well-nourished. No distress.   HENT:   Head: Normocephalic and atraumatic.   Eyes: EOM are normal. Right eye exhibits no discharge. Left eye exhibits no discharge.   Cardiovascular: Regular rhythm.   Tachycardic    Pulmonary/Chest: Effort normal and breath sounds normal. No respiratory distress. She has no wheezes.   Musculoskeletal: She exhibits no edema.   Neurological: She is alert and oriented to person, place, and time.   Skin: Skin is warm and dry. She is not diaphoretic. No erythema.   Psychiatric: She has a normal mood and affect.   Vitals reviewed.    Vitals:    08/24/18 1616   BP: 120/80   Pulse: (!) 136   Temp: 98.1 °F (36.7 °C)       Assessment/  PLAN     Tachycardia  -     Holter monitor - 48 hour; Future    Elevated blood-pressure reading, without diagnosis of hypertension  - bp raised initially , came down on manual check 2nd time     Will get holter and if wnl, may start beta blocker         Luis Zapata MD  [facility]

## 2018-08-27 ENCOUNTER — CLINICAL SUPPORT (OUTPATIENT)
Dept: CARDIOLOGY | Facility: CLINIC | Age: 58
End: 2018-08-27
Attending: FAMILY MEDICINE
Payer: COMMERCIAL

## 2018-08-27 DIAGNOSIS — R00.0 TACHYCARDIA: ICD-10-CM

## 2018-08-27 PROCEDURE — 93224 XTRNL ECG REC UP TO 48 HRS: CPT | Mod: ,,, | Performed by: INTERNAL MEDICINE

## 2018-08-30 ENCOUNTER — TELEPHONE (OUTPATIENT)
Dept: FAMILY MEDICINE | Facility: CLINIC | Age: 58
End: 2018-08-30

## 2018-08-30 ENCOUNTER — TELEPHONE (OUTPATIENT)
Dept: PHYSICAL MEDICINE AND REHAB | Facility: CLINIC | Age: 58
End: 2018-08-30

## 2018-08-30 NOTE — TELEPHONE ENCOUNTER
----- Message from Qiana Foster sent at 8/30/2018  8:53 AM CDT -----  Contact: pt   States she's calling rg missing a phone call and is calling to see if it were someone from her office that called and can be reached at [phone]//thanks/dbw

## 2018-08-30 NOTE — TELEPHONE ENCOUNTER
Contacted the pt to reschedule her IPM appt from 10/22 to earlier ; pt declined. Pt canceled her appt with  as well.

## 2018-08-30 NOTE — TELEPHONE ENCOUNTER
Pt notified holter was normal  Also states her bp has been normal but does not have any reading to report  Will start recording reading and report them to the office.

## 2018-08-30 NOTE — TELEPHONE ENCOUNTER
----- Message from Luis Zapata MD sent at 8/29/2018  9:24 PM CDT -----  Also please ask , has she been monitoring her blood pressure ?  I would like some readings, or she can follow up in clinic

## 2019-01-02 NOTE — [employer] PATIENT INSTRUCTIONS
Chemotherapy Administration    Pre-assessment Data: Antineoplastic Agents  Other:   See toxicity flow sheet for assessment [x]     Physician Notification of Concerns Related to Chemotherapy Administration:   Physician Notified [place] / Time of Notification      Interventions:   Lab work assessed  [x]   Height / Weight verified for dose [x]   Current MAR reviewed [x]   Emergency drugs available as appropriate [x]   Anaphylaxis assessment completed [x]   Pre-medications administered as ordered [x]   Blood return noted upon initiation of chemotherapy [x]   Blood return noted each 1-2ml of a vesicant medication if given IV push []   Blood return noted each 2-3ml of a non-vesicant medication if given IV push []   Monitor for signs / symptoms of hypersensitivity reaction [x]   Chemotherapy orders (drug/dose/rate) verified by 2 Chemo certified RNs [x]   Monitor IV site and blood return throughout the infusion of the medication [x]   Document IV site checks on the IV assessment form [x]   Document chemotherapy teaching on the Patient Education tab [x]   Document patient verbalizes understanding of medications being administered [x]   If IV infiltration, see ONS Guidelines []   Other:      []         Taxol Administration:  Taxol is filtered, use specific tubing for administration. If hypersensitivity reaction occurs, STOP TAXOL, maintain plain IV and notify physician for additional orders. Taxol is considered an irritant in low doses. High dose Taxol is considered a vesicant. Check with physician if infusion should be through a central line.    Neurological assessment completed pre administration [x]   Pre-medications administered as ordered [x]   Document baseline vital signs before administration [x]   For 3 hour Taxol: Document blood pressure, pulse, respiratory rate every 15 min times 1 hour after the start of Taxol [x]   For 1 hour Taxol: Document blood pressure, pulse, respiratory rate 15 min after the start of Taxol Discharge Summary/Instructions after an Endoscopic Procedure  Patient Name: Jenni Rush  Patient MRN: [id number]  Patient YOB: 1960 Tuesday, June 12, 2018 Carl Mora III, MD  RESTRICTIONS:  During your procedure today, you received medications for sedation.  These   medications may affect your judgment, balance and coordination.  Therefore,   for 24 hours, you have the following restrictions:   - DO NOT drive a car, operate machinery, make legal/financial decisions,   sign important papers or drink alcohol.    ACTIVITY:  Today: no heavy lifting, straining or running due to procedural   sedation/anesthesia.  The following day: return to full activity including work.  DIET:  Eat and drink normally unless instructed otherwise.     TREATMENT FOR COMMON SIDE EFFECTS:  - Mild abdominal pain, nausea, belching, bloating or excessive gas:  rest,   eat lightly and use a heating pad.  - Sore Throat: treat with throat lozenges and/or gargle with warm salt   water.  - Because air was used during the procedure, expelling large amounts of air   from your rectum or belching is normal.  - If a bowel prep was taken, you may not have a bowel movement for 1-3 days.    This is normal.  SYMPTOMS TO WATCH FOR AND REPORT TO YOUR PHYSICIAN:  1. Abdominal pain or bloating, other than gas cramps.  2. Chest pain.  3. Back pain.  4. Signs of infection such as: chills or fever occurring within 24 hours   after the procedure.  5. Rectal bleeding, which would show as bright red, maroon, or black stools.   (A tablespoon of blood from the rectum is not serious, especially if   hemorrhoids are present.)  6. Vomiting.  7. Weakness or dizziness.  GO DIRECTLY TO THE NEAREST EMERGENCY ROOM IF YOU HAVE ANY OF THE FOLLOWING:      Difficulty breathing              Chills and/or fever over 101 F   Persistent vomiting and/or vomiting blood   Severe abdominal pain   Severe chest pain   Black, tarry stools   Bleeding- more than one  []   Document blood pressure, pulse, respiratory rate at the completion of Taxol [x]   Other:     [] tablespoon   Any other symptom or condition that you feel may need urgent attention  Your doctor recommends these additional instructions:  If any biopsies were taken, your doctors clinic will contact you in 1 to 2   weeks with any results.  - Discharge patient to home (via wheelchair).   - High fiber diet.   - Continue present medications.   - Augmented water consumption diet.   - Repeat colonoscopy in 10 years for screening purposes.   - Return to primary care physician as previously scheduled.   - Discharge patient to home (via wheelchair).   - High fiber diet.   - Continue present medications.   - Repeat colonoscopy in 10 years for screening purposes.   - Return to primary care physician as previously scheduled.  For questions, problems or results please call your physician Carl Mora III, MD at Work:  [phone]  If you have any questions about the above instructions, call the GI   department at [phone] or call the endoscopy unit at [phone]   from 7am until 3 pm.  [facility], EMERGENCY ROOM PHONE NUMBER:   [phone]  IF A COMPLICATION OR EMERGENCY SITUATION ARISES AND YOU ARE UNABLE TO REACH   YOUR PHYSICIAN - GO DIRECTLY TO THE EMERGENCY ROOM.  I have read or have had read to me these discharge instructions for my   procedure and have received a written copy.  I understand these   instructions and will follow-up with my physician if I have any questions.     __________________________________       _____________________________________  Nurse Signature                                          Patient/Designated   Responsible Party Signature  Carl Mora III, MD  6/12/2018 8:44:58 AM  This report has been verified and signed electronically.  [employer]

## 2020-03-18 ENCOUNTER — NURSE TRIAGE (OUTPATIENT)
Dept: ADMINISTRATIVE | Facility: CLINIC | Age: 60
End: 2020-03-18

## 2020-03-19 ENCOUNTER — TELEPHONE (OUTPATIENT)
Dept: FAMILY MEDICINE | Facility: CLINIC | Age: 60
End: 2020-03-19

## 2020-03-19 NOTE — TELEPHONE ENCOUNTER
----- Message from Lynnette Cox sent at 3/19/2020  8:32 AM CDT -----  Contact: Pt  Pt called and said she had a missed call from your office and wants someone to call her back     Pt can  Be reached at [phone]

## 2020-03-19 NOTE — TELEPHONE ENCOUNTER
One attempt, LVM    Reason for Disposition   Message left on unidentified voice mail.  Phone number verified.    Protocols used: NO CONTACT OR DUPLICATE CONTACT CALL-A-AH

## 2020-03-23 ENCOUNTER — NURSE TRIAGE (OUTPATIENT)
Dept: ADMINISTRATIVE | Facility: CLINIC | Age: 60
End: 2020-03-23

## 2020-03-23 NOTE — TELEPHONE ENCOUNTER
Patient contacted [facility] for cough. Patient saw provider on 2/27/2020 where she was diagnosed with bronchitis.  Patient reports that she finished her therapy, and still has nagging cough and intermittent SOB- none present during call.  Patient denies fever.  Disposition: See PCP Within 3 Days.  Transferred to [facility] for scheduling.    Reason for Disposition   Cough has been present for > 3 weeks    Additional Information   Negative: Severe difficulty breathing (e.g., struggling for each breath, speaks in single words)   Negative: Bluish (or gray) lips or face now   Negative: [1] Rapid onset of cough AND [2] has hives   Negative: Coughing started suddenly after medicine, an allergic food or bee sting   Negative: [1] Difficulty breathing AND [2] exposure to flames, smoke, or fumes   Negative: [1] Stridor AND [2] difficulty breathing   Negative: Sounds like a life-threatening emergency to the triager   Negative: Chest pain is main symptom   Negative: Choked on object of food that could be caught in the throat   Negative: [1] Previous asthma attacks AND [2] this feels like asthma attack   Negative: Chest pain  (Exception: MILD central chest pain, present only when coughing)   Negative: Wet (productive) cough (i.e., white-yellow, yellow, green, or lacy colored sputum)   Negative: Difficulty breathing   Negative: Patient sounds very sick or weak to the triager   Negative: [1] Fever > 100.0 F (37.8 C) AND [2] diabetes mellitus or weak immune system (e.g., HIV positive, cancer chemo, splenectomy, chronic steroids)   Negative: [1] Fever > 100.0 F (37.8 C) AND [2] bedridden (e.g., nursing home patient, CVA, chronic illness, recovering from surgery)   Negative: [1] Fever > 101 F (38.3 C) AND [2] age > 60   Negative: Fever > 103 F (39.4 C)   Negative: Cough lasts > 3 weeks   Negative: [1] Coughed up blood AND [2] > 1 tablespoon (15 ml) (Exception: blood-tinged sputum)   Negative: Wheezing is present    Negative: [1] Ankle swelling AND [2] swelling is increasing   Negative: SEVERE coughing spells (e.g., whooping sound after coughing, vomiting after coughing)   Negative: [1] Continuous (nonstop) coughing interferes with work or school AND [2] no improvement using cough treatment per protocol   Negative: Fever present > 3 days (72 hours)   Negative: [1] Fever returns after gone for over 24 hours AND [2] symptoms worse or not improved   Negative: [1] Using nasal washes and pain medicine > 24 hours AND [2] sinus pain (around cheekbone or eye) persists   Negative: Earache is present    Protocols used: COUGH - ACUTE NON-PRODUCTIVE-A-AH

## 2020-03-25 ENCOUNTER — TELEPHONE (OUTPATIENT)
Dept: FAMILY MEDICINE | Facility: CLINIC | Age: 60
End: 2020-03-25

## 2020-03-25 NOTE — TELEPHONE ENCOUNTER
----- Message from Naveen Miller sent at 3/25/2020  9:11 AM CDT -----  Contact: Pt   Pt called in regards to scheduling a VS with the provider. The pt stated that she has a cough and anxiety and has been having symptoms since Mid February. Sore throat , coughing , head aches, muscle pains and chest pains. Pt can be reached at [phone] ([email or web address]).

## 2020-03-26 ENCOUNTER — TELEPHONE (OUTPATIENT)
Dept: FAMILY MEDICINE | Facility: CLINIC | Age: 60
End: 2020-03-26

## 2020-03-26 ENCOUNTER — OFFICE VISIT (OUTPATIENT)
Dept: FAMILY MEDICINE | Facility: CLINIC | Age: 60
End: 2020-03-26
Payer: COMMERCIAL

## 2020-03-26 DIAGNOSIS — R05.9 COUGH: Primary | ICD-10-CM

## 2020-03-26 PROCEDURE — 99213 OFFICE O/P EST LOW 20 MIN: CPT | Mod: 95,,, | Performed by: FAMILY MEDICINE

## 2020-03-26 PROCEDURE — 99213 PR OFFICE/OUTPT VISIT, EST, LEVL III, 20-29 MIN: ICD-10-PCS | Mod: 95,,, | Performed by: FAMILY MEDICINE

## 2020-03-26 NOTE — PROGRESS NOTES
Jenni Rush    Chief Complaint   Patient presents with    Cough       History of Present Illness:   The patient location is: Home  The chief complaint leading to consultation is: Cough  Visit type: Virtual visit with synchronous audio and video  Total time spent with patient: 18 minutes  Each patient to whom he or she provides medical services by telemedicine is:  (1) informed of the relationship between the physician and patient and the respective role of any other health care provider with respect to management of the patient; and (2) notified that he or she may decline to receive medical services by telemedicine and may withdraw from such care at any time.    Notes:     Ms. Rush states her employer ([employer]) will not let her return to work without clearance.  She states she developed sore throat and cough in mid February 2020 and was evaluated at [facility] and by x-ray states she was told she had bronchitis in her right lung.  She states she was treated with steroid dose pack, lung inhaler and cough medication without help.  She states she started juicing and drinking hot liquids with help.  She states now has occasional and intermittent dry cough but feels much better.      She admits she has been slightly anxious with everything going on in community with Covid-19.  She denies exposure to person with known Covid-19.     She states she called in and did not report to work on March 17, 2020 as she stated she needed to accompany her  to a medical appointment; she states his doctor gave her an excuse for that day.  She states she has not returned to work since calling in on March 17, 2020,  She states she was told by her employer that she does not have to come in to work if she does not want to, but she states she needs work excuse/clearance to return to work.  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    Otherwise, she denies having fever, chills, fatigue, appetite changes; sinus symptoms;  "shortness of breath, wheezing; chest pain, palpitations, leg swelling; abdominal pain, nausea, vomiting, diarrhea, constipation; unusual urinary symptoms; myalgias, back pain; headache; depression, homicidal or suicidal thoughts.            No current outpatient medications on file.       Review of Systems   Constitutional: Negative for appetite change, chills, fatigue and fever.   HENT: Negative for congestion, postnasal drip, rhinorrhea, sinus pressure, sinus pain, sneezing and sore throat.    Respiratory: Positive for cough. Negative for shortness of breath and wheezing.    Cardiovascular: Negative for chest pain, palpitations and leg swelling.   Gastrointestinal: Negative for abdominal pain, constipation, diarrhea, nausea and vomiting.   Genitourinary: Negative for difficulty urinating.   Musculoskeletal: Negative for back pain and myalgias.   Neurological: Negative for headaches.   Psychiatric/Behavioral: Negative for dysphoric mood and suicidal ideas. The patient is nervous/anxious.         Negative for homicidal ideas.       Objective:  Physical Exam   Constitutional: She is oriented to person, place, and time. She appears well-developed and well-nourished. No distress.   Neurological: She is alert and oriented to person, place, and time.   Psychiatric: She has a normal mood and affect. Her behavior is normal. Judgment and thought content normal.       ASSESSMENT:  1. Cough        PLAN:  Jenni was seen today for cough.    Diagnoses and all orders for this visit:    Cough       Per patient request the following letter was approved with work return information.  "Ms. Jenni Rush stated she was evaluated and treated for respiratory symptoms in mid February 2020 and with treatment feels much better with only occasional cough for residual. As she reported no other symptoms today, she does not meet criteria for Covid-19 testing and may return to work.  Please excuse Ms. Jenni Rush from work " "March 17, 2020 - March 26, 2020 with return to full work duty on March 27, 2020."  Continue current medications, follow low sodium, low cholesterol, low carb diet, daily walks.  Follow up if symptoms worsen or fail to improve.      "

## 2020-03-26 NOTE — TELEPHONE ENCOUNTER
----- Message from Theresa Patel MD sent at 3/26/2020  4:21 PM CDT -----  Please notify pt portal how she can retrieve her work excuse letter I did for her. Thanks.

## 2020-03-27 ENCOUNTER — PATIENT MESSAGE (OUTPATIENT)
Dept: FAMILY MEDICINE | Facility: CLINIC | Age: 60
End: 2020-03-27

## 2020-07-17 DIAGNOSIS — Z12.39 BREAST CANCER SCREENING: ICD-10-CM

## 2020-08-17 ENCOUNTER — PATIENT OUTREACH (OUTPATIENT)
Dept: ADMINISTRATIVE | Facility: HOSPITAL | Age: 60
End: 2020-08-17

## 2020-10-06 ENCOUNTER — PATIENT MESSAGE (OUTPATIENT)
Dept: ADMINISTRATIVE | Facility: HOSPITAL | Age: 60
End: 2020-10-06

## 2021-04-28 ENCOUNTER — PATIENT MESSAGE (OUTPATIENT)
Dept: RESEARCH | Facility: HOSPITAL | Age: 61
End: 2021-04-28

## 2021-09-13 NOTE — ANESTHESIA PREPROCEDURE EVALUATION
06/12/2018  Jenni Rush is a 57 y.o., female.    Anesthesia Evaluation    I have reviewed the Patient Summary Reports.    I have reviewed the Nursing Notes.   I have reviewed the Medications.     Review of Systems  Anesthesia Hx:  No problems with previous Anesthesia    Social:  Non-Smoker    Cardiovascular:  Cardiovascular Normal     Pulmonary:  Pulmonary Normal    Renal/:  Renal/ Normal     Endocrine:  Endocrine Normal    Psych:   Psychiatric History anxiety          Physical Exam  General:  Well nourished    Airway/Jaw/Neck:  Airway Findings: Mouth Opening: Normal Tongue: Normal  General Airway Assessment: Adult       Chest/Lungs:  Chest/Lungs Findings: Normal Respiratory Rate     Heart/Vascular:  Heart Findings: Rate: Normal             Anesthesia Plan  Type of Anesthesia, risks & benefits discussed:  Anesthesia Type:  MAC  Patient's Preference:   Intra-op Monitoring Plan: standard ASA monitors  Intra-op Monitoring Plan Comments:   Post Op Pain Control Plan:   Post Op Pain Control Plan Comments:   Induction:   IV  Beta Blocker:  Patient is not currently on a Beta-Blocker (No further documentation required).       Informed Consent: Patient understands risks and agrees with Anesthesia plan.  Questions answered. Anesthesia consent signed with patient.  ASA Score: 2     Day of Surgery Review of History & Physical: I have interviewed and examined the patient. I have reviewed the patient's H&P dated:  There are no significant changes.          Ready For Surgery From Anesthesia Perspective.       
Regular Diet - No restrictions

## 2021-09-29 DIAGNOSIS — Z12.31 OTHER SCREENING MAMMOGRAM: ICD-10-CM

## 2022-11-04 NOTE — TELEPHONE ENCOUNTER
Pt needs sooner appt with .    Yakov  [address]  [place], [address] [place] [address]  [phone]   Fax:  [phone]    Long term/ambulatory EEG Monitoring Report    Mikel Chand  9/28/1968  Referring Physician:  Dr. Graciela Chu  Reason for monitoring:  Acute CVA (cerebrovascular accident) [facility])  (primary encounter diagnosis)  Hypokalemia  Renal impairment    Dates of Monitoring: Start date:  11/3/2022 at 8 AM     End date:   11/4/2022 at 10 AM    Recording specifications:   22 scalp electrodes places in a modified International 10-20 System montage with subtemporal coverage. Patient's electrocardiogram was recorded simultaneously    Antiepileptic and other relevant drugs:  No current outpatient medications on file.     MONITORING SUMMARY:   EEG Background:   7.5-8 Hz,  10-20 microvolts   Sleep:   Normal sleep architecture, no abnormal discharges   Interictal Discharges/Localization/Type:   none   Seizures/Localization:   No paroxysmal activities seen   Diary Events:  No clinical seizure like activity seen other than restless confusion in the beginning of the study 11/3      IMPRESSION and DIAGNOSIS:    No paroxysmal or epileptiform activity detected from 11/3 to 11/4/2022      Carmel Goodell, M  Neurology

## 2023-03-08 ENCOUNTER — OFFICE VISIT (OUTPATIENT)
Dept: OBSTETRICS AND GYNECOLOGY | Facility: CLINIC | Age: 63
End: 2023-03-08
Payer: COMMERCIAL

## 2023-03-08 ENCOUNTER — LAB VISIT (OUTPATIENT)
Dept: LAB | Facility: HOSPITAL | Age: 63
End: 2023-03-08
Attending: NURSE PRACTITIONER
Payer: COMMERCIAL

## 2023-03-08 ENCOUNTER — OFFICE VISIT (OUTPATIENT)
Dept: INTERNAL MEDICINE | Facility: CLINIC | Age: 63
End: 2023-03-08
Payer: COMMERCIAL

## 2023-03-08 VITALS
DIASTOLIC BLOOD PRESSURE: 84 MMHG | HEIGHT: 62 IN | SYSTOLIC BLOOD PRESSURE: 128 MMHG | WEIGHT: 151 LBS | BODY MASS INDEX: 27.79 KG/M2

## 2023-03-08 VITALS
HEART RATE: 107 BPM | OXYGEN SATURATION: 98 % | SYSTOLIC BLOOD PRESSURE: 130 MMHG | BODY MASS INDEX: 27.92 KG/M2 | WEIGHT: 151.69 LBS | DIASTOLIC BLOOD PRESSURE: 88 MMHG | HEIGHT: 62 IN | TEMPERATURE: 98 F

## 2023-03-08 DIAGNOSIS — F41.9 ANXIETY DISORDER, UNSPECIFIED TYPE: ICD-10-CM

## 2023-03-08 DIAGNOSIS — Z12.4 ENCOUNTER FOR SCREENING FOR CERVICAL CANCER: ICD-10-CM

## 2023-03-08 DIAGNOSIS — N60.12 FIBROCYSTIC BREAST CHANGES, BILATERAL: ICD-10-CM

## 2023-03-08 DIAGNOSIS — Z00.00 ANNUAL PHYSICAL EXAM: Primary | ICD-10-CM

## 2023-03-08 DIAGNOSIS — Z12.31 ENCOUNTER FOR SCREENING MAMMOGRAM FOR MALIGNANT NEOPLASM OF BREAST: ICD-10-CM

## 2023-03-08 DIAGNOSIS — N60.11 FIBROCYSTIC BREAST CHANGES, BILATERAL: ICD-10-CM

## 2023-03-08 DIAGNOSIS — K59.09 CHRONIC CONSTIPATION: ICD-10-CM

## 2023-03-08 DIAGNOSIS — Z01.419 WELL WOMAN EXAM WITH ROUTINE GYNECOLOGICAL EXAM: Primary | ICD-10-CM

## 2023-03-08 DIAGNOSIS — Z00.00 ANNUAL PHYSICAL EXAM: ICD-10-CM

## 2023-03-08 PROBLEM — R03.0 ELEVATED BLOOD-PRESSURE READING, WITHOUT DIAGNOSIS OF HYPERTENSION: Status: RESOLVED | Noted: 2018-05-26 | Resolved: 2023-03-08

## 2023-03-08 PROBLEM — Z12.11 COLON CANCER SCREENING: Status: RESOLVED | Noted: 2018-06-12 | Resolved: 2023-03-08

## 2023-03-08 LAB
ESTIMATED AVG GLUCOSE: 134 MG/DL (ref 68–131)
HBA1C MFR BLD: 6.3 % (ref 4–5.6)

## 2023-03-08 PROCEDURE — 87624 HPV HI-RISK TYP POOLED RSLT: CPT | Performed by: NURSE PRACTITIONER

## 2023-03-08 PROCEDURE — 99999 PR PBB SHADOW E&M-EST. PATIENT-LVL III: ICD-10-PCS | Mod: PBBFAC,,, | Performed by: NURSE PRACTITIONER

## 2023-03-08 PROCEDURE — 80061 LIPID PANEL: CPT | Performed by: NURSE PRACTITIONER

## 2023-03-08 PROCEDURE — 99999 PR PBB SHADOW E&M-EST. PATIENT-LVL III: CPT | Mod: PBBFAC,,, | Performed by: NURSE PRACTITIONER

## 2023-03-08 PROCEDURE — 1160F RVW MEDS BY RX/DR IN RCRD: CPT | Mod: CPTII,S$GLB,, | Performed by: NURSE PRACTITIONER

## 2023-03-08 PROCEDURE — 3008F BODY MASS INDEX DOCD: CPT | Mod: CPTII,S$GLB,, | Performed by: NURSE PRACTITIONER

## 2023-03-08 PROCEDURE — 1159F MED LIST DOCD IN RCRD: CPT | Mod: CPTII,S$GLB,, | Performed by: NURSE PRACTITIONER

## 2023-03-08 PROCEDURE — 99386 PREV VISIT NEW AGE 40-64: CPT | Mod: S$GLB,,, | Performed by: NURSE PRACTITIONER

## 2023-03-08 PROCEDURE — 3079F DIAST BP 80-89 MM HG: CPT | Mod: CPTII,S$GLB,, | Performed by: NURSE PRACTITIONER

## 2023-03-08 PROCEDURE — 1159F PR MEDICATION LIST DOCUMENTED IN MEDICAL RECORD: ICD-10-PCS | Mod: CPTII,S$GLB,, | Performed by: NURSE PRACTITIONER

## 2023-03-08 PROCEDURE — 3074F PR MOST RECENT SYSTOLIC BLOOD PRESSURE < 130 MM HG: ICD-10-PCS | Mod: CPTII,S$GLB,, | Performed by: NURSE PRACTITIONER

## 2023-03-08 PROCEDURE — 85025 COMPLETE CBC W/AUTO DIFF WBC: CPT | Performed by: NURSE PRACTITIONER

## 2023-03-08 PROCEDURE — 3008F PR BODY MASS INDEX (BMI) DOCUMENTED: ICD-10-PCS | Mod: CPTII,S$GLB,, | Performed by: NURSE PRACTITIONER

## 2023-03-08 PROCEDURE — 1160F PR REVIEW ALL MEDS BY PRESCRIBER/CLIN PHARMACIST DOCUMENTED: ICD-10-PCS | Mod: CPTII,S$GLB,, | Performed by: NURSE PRACTITIONER

## 2023-03-08 PROCEDURE — 3079F PR MOST RECENT DIASTOLIC BLOOD PRESSURE 80-89 MM HG: ICD-10-PCS | Mod: CPTII,S$GLB,, | Performed by: NURSE PRACTITIONER

## 2023-03-08 PROCEDURE — 84443 ASSAY THYROID STIM HORMONE: CPT | Performed by: NURSE PRACTITIONER

## 2023-03-08 PROCEDURE — 3075F PR MOST RECENT SYSTOLIC BLOOD PRESS GE 130-139MM HG: ICD-10-PCS | Mod: CPTII,S$GLB,, | Performed by: NURSE PRACTITIONER

## 2023-03-08 PROCEDURE — 99396 PREV VISIT EST AGE 40-64: CPT | Mod: S$GLB,,, | Performed by: NURSE PRACTITIONER

## 2023-03-08 PROCEDURE — 99999 PR PBB SHADOW E&M-EST. PATIENT-LVL IV: CPT | Mod: PBBFAC,,, | Performed by: NURSE PRACTITIONER

## 2023-03-08 PROCEDURE — 83036 HEMOGLOBIN GLYCOSYLATED A1C: CPT | Performed by: NURSE PRACTITIONER

## 2023-03-08 PROCEDURE — 99396 PR PREVENTIVE VISIT,EST,40-64: ICD-10-PCS | Mod: S$GLB,,, | Performed by: NURSE PRACTITIONER

## 2023-03-08 PROCEDURE — 3075F SYST BP GE 130 - 139MM HG: CPT | Mod: CPTII,S$GLB,, | Performed by: NURSE PRACTITIONER

## 2023-03-08 PROCEDURE — 88175 CYTOPATH C/V AUTO FLUID REDO: CPT | Performed by: NURSE PRACTITIONER

## 2023-03-08 PROCEDURE — 36415 COLL VENOUS BLD VENIPUNCTURE: CPT | Performed by: NURSE PRACTITIONER

## 2023-03-08 PROCEDURE — 99999 PR PBB SHADOW E&M-EST. PATIENT-LVL IV: ICD-10-PCS | Mod: PBBFAC,,, | Performed by: NURSE PRACTITIONER

## 2023-03-08 PROCEDURE — 3074F SYST BP LT 130 MM HG: CPT | Mod: CPTII,S$GLB,, | Performed by: NURSE PRACTITIONER

## 2023-03-08 PROCEDURE — 80053 COMPREHEN METABOLIC PANEL: CPT | Performed by: NURSE PRACTITIONER

## 2023-03-08 PROCEDURE — 99386 PR PREVENTIVE VISIT,NEW,40-64: ICD-10-PCS | Mod: S$GLB,,, | Performed by: NURSE PRACTITIONER

## 2023-03-08 RX ORDER — PREGABALIN 50 MG/1
50 CAPSULE ORAL 3 TIMES DAILY
COMMUNITY
Start: 2023-02-08 | End: 2023-03-08

## 2023-03-08 RX ORDER — TIZANIDINE 4 MG/1
4 TABLET ORAL NIGHTLY
COMMUNITY
Start: 2023-02-08

## 2023-03-08 RX ORDER — LIDOCAINE 50 MG/G
1 PATCH CUTANEOUS
COMMUNITY
Start: 2023-02-08

## 2023-03-08 RX ORDER — VALACYCLOVIR HYDROCHLORIDE 1 G/1
1000 TABLET, FILM COATED ORAL 2 TIMES DAILY
COMMUNITY

## 2023-03-08 NOTE — PROGRESS NOTES
Subjective:       Patient ID: Jenni Rush is a 62 y.o. female.    Chief Complaint:  Annual Exam    No LMP recorded. Patient is postmenopausal.  History of Present Illness  Annual Exam-Postmenopausal  Patient presents for annual exam. The patient has no complaints today. The patient is sexually active. GYN screening history: last pap: patient does not recall results of last pap and patient does not recall when last pap was and last mammogram: approximate date 2018 and was normal. The patient is not taking hormone replacement therapy. Patient denies post-menopausal vaginal bleeding. The patient wears seatbelts: yes. The patient participates in regular exercise: no. Has the patient ever been transfused or tattooed?: yes. The patient reports that there is not domestic violence in her life.    OB History    Para Term  AB Living   2 2       2   SAB IAB Ectopic Multiple Live Births                  # Outcome Date GA Lbr Darrel/2nd Weight Sex Delivery Anes PTL Lv   2 Para            1 Para                Review of Systems  Review of Systems   Constitutional:  Negative for appetite change, fatigue, fever and unexpected weight change.   Eyes:  Negative for visual disturbance.   Cardiovascular:  Negative for chest pain.   Gastrointestinal:  Negative for abdominal pain, bloating, constipation, diarrhea, nausea and vomiting.   Genitourinary:  Negative for bladder incontinence, dysmenorrhea, dyspareunia, dysuria, flank pain, frequency, genital sores, menorrhagia, menstrual problem, pelvic pain, urgency, vaginal bleeding, vaginal discharge, vaginal pain, postcoital bleeding, vaginal dryness and vaginal odor.   Integumentary:  Negative for rash, acne, mole/lesion, breast mass, nipple discharge, breast skin changes and breast tenderness.   Neurological:  Negative for syncope and headaches.   Hematological:  Negative for adenopathy. Does not bruise/bleed easily.   All other systems reviewed and are  negative.  Breast: Positive for breast self exam.Negative for asymmetry, lump, mass, nipple discharge, skin changes and tenderness         Objective:      Physical Exam:   Constitutional: She is oriented to person, place, and time. She appears well-developed and well-nourished.    HENT:   Head: Normocephalic and atraumatic.    Eyes: Pupils are equal, round, and reactive to light. Conjunctivae and EOM are normal.     Cardiovascular:  Normal rate and regular rhythm.             Pulmonary/Chest: Effort normal. Right breast exhibits no inverted nipple, no mass, no nipple discharge, no skin change, no tenderness, no bleeding and no swelling. Left breast exhibits no inverted nipple, no mass, no nipple discharge, no skin change, no tenderness, no bleeding and no swelling. Breasts are symmetrical.        Abdominal: Soft. Hernia confirmed negative in the right inguinal area and confirmed negative in the left inguinal area.     Genitourinary:    Inguinal canal, vagina, uterus, right adnexa, left adnexa and rectum normal.      Pelvic exam was performed with patient supine.   The external female genitalia was normal.   Genitalia hair distrobution normal .   Labial bartholins normal.There is no rash, tenderness, lesion or injury on the right labia. There is no rash, tenderness, lesion or injury on the left labia. Cervix is normal. No erythema,  no vaginal discharge, bleeding, rectocele, cystocele or unspecified prolapse of vaginal walls in the vagina. Cervix exhibits no motion tenderness and no friability. Uterus is not tender.           Musculoskeletal: Normal range of motion and moves all extremeties.      Lymphadenopathy: No inguinal adenopathy noted on the right or left side.    Neurological: She is alert and oriented to person, place, and time.    Skin: Skin is warm and dry. No rash noted. No erythema.    Psychiatric: She has a normal mood and affect. Her behavior is normal. Judgment and thought content normal.           Assessment:     1. Well woman exam with routine gynecological exam    2. Encounter for screening for cervical cancer              Plan:   Jenni was seen today for annual exam.    Diagnoses and all orders for this visit:    Well woman exam with routine gynecological exam  -     Ambulatory referral/consult to Obstetrics / Gynecology  -     Liquid-Based Pap Smear, Screening  -     HPV High Risk Genotypes, PCR    Encounter for screening for cervical cancer  -     Liquid-Based Pap Smear, Screening  -     HPV High Risk Genotypes, PCR      Follow up with me in 1 year for annual well woman exam.

## 2023-03-08 NOTE — PROGRESS NOTES
Subjective:       Patient ID: Jenni Rush is a 62 y.o. female.    Chief Complaint: Annual Exam    HPI    Pt here for above  No acute issues   She does report that she is seeing a provider for neck injury (workmans comp)  Constipation - stable currently  Hx of fibrocystic breasts  Anxiety d/o-untreated. Declines txment at this time      Past Medical History:   Diagnosis Date    Elevated blood pressure reading without diagnosis of hypertension     History of abnormal cervical Pap smear     x 1 with repeat normal - thus follow routine pap screening    Postmenopausal        Past Surgical History:   Procedure Laterality Date    COLONOSCOPY N/A 6/12/2018    Procedure: COLONOSCOPY;  Surgeon: Carl Mora III, MD;  Location: [facility];  Service: Endoscopy;  Laterality: N/A;    None       Social History     Socioeconomic History    Marital status:     Number of children: 2   Occupational History    Occupation:    Tobacco Use    Smoking status: Never    Smokeless tobacco: Never   Substance and Sexual Activity    Alcohol use: Yes     Alcohol/week: 1.0 standard drink     Types: 1 Glasses of wine per week     Comment: rarely    Drug use: No    Sexual activity: Yes     Partners: Male     Birth control/protection: Post-menopausal   Social History Narrative    She wears seatbelt.     Review of patient's allergies indicates:  No Known Allergies  Current Outpatient Medications   Medication Sig    LIDODERM 5 % 1 patch.    tiZANidine (ZANAFLEX) 4 MG tablet Take 4 mg by mouth every evening.    valACYclovir (VALTREX) 1000 MG tablet Take 1,000 mg by mouth 2 (two) times daily.     No current facility-administered medications for this visit.           Review of Systems   Constitutional:  Negative for activity change, appetite change, chills, diaphoresis, fatigue, fever and unexpected weight change.   HENT:  Negative for congestion, ear pain, postnasal drip, rhinorrhea, sinus pressure, sinus pain, sneezing,  sore throat, tinnitus, trouble swallowing and voice change.    Eyes:  Negative for photophobia, pain and visual disturbance.   Respiratory:  Negative for cough, chest tightness, shortness of breath and wheezing.    Cardiovascular:  Negative for chest pain, palpitations and leg swelling.   Gastrointestinal:  Positive for constipation. Negative for abdominal distention, abdominal pain, diarrhea, nausea and vomiting.   Genitourinary:  Negative for decreased urine volume, difficulty urinating, dysuria, flank pain, frequency, hematuria and urgency.   Musculoskeletal:  Negative for arthralgias, back pain, joint swelling, neck pain and neck stiffness.   Allergic/Immunologic: Negative for immunocompromised state.   Neurological:  Negative for dizziness, tremors, seizures, syncope, facial asymmetry, speech difficulty, weakness, light-headedness, numbness and headaches.   Hematological:  Negative for adenopathy. Does not bruise/bleed easily.   Psychiatric/Behavioral:  Negative for confusion and sleep disturbance. The patient is nervous/anxious.      Objective:      Physical Exam  HENT:      Head: Normocephalic and atraumatic.      Right Ear: Tympanic membrane normal.      Left Ear: Tympanic membrane normal.   Eyes:      Conjunctiva/sclera: Conjunctivae normal.   Cardiovascular:      Rate and Rhythm: Normal rate and regular rhythm.      Heart sounds: Normal heart sounds.   Pulmonary:      Effort: Pulmonary effort is normal.      Breath sounds: Normal breath sounds.   Abdominal:      General: Bowel sounds are normal.      Palpations: Abdomen is soft.   Musculoskeletal:         General: Normal range of motion.      Cervical back: Normal range of motion and neck supple.   Skin:     General: Skin is warm and dry.   Neurological:      Mental Status: She is alert and oriented to person, place, and time.       Assessment:     Vitals:    03/08/23 1046   BP: 130/88   Pulse: 107   Temp: 98.4 °F (36.9 °C)         1. Annual physical exam     2. Chronic constipation    3. Encounter for screening mammogram for malignant neoplasm of breast    4. Fibrocystic breast changes, bilateral    5. Anxiety disorder, unspecified type        Plan:   Annual physical exam  -     Ambulatory referral/consult to Obstetrics / Gynecology; Future; Expected date: 03/15/2023  -     Lipid Panel; Future; Expected date: 03/08/2023  -     Comprehensive Metabolic Panel; Future; Expected date: 03/08/2023  -     TSH; Future; Expected date: 03/08/2023  -     CBC Auto Differential; Future; Expected date: 03/08/2023  -     Hemoglobin A1C; Future; Expected date: 03/08/2023    Chronic constipation    Encounter for screening mammogram for malignant neoplasm of breast  -     Mammo Digital Screening Bilat w/ Cresencio; Future; Expected date: 03/08/2023    Fibrocystic breast changes, bilateral    Anxiety disorder, unspecified type

## 2023-03-09 ENCOUNTER — HOSPITAL ENCOUNTER (OUTPATIENT)
Dept: RADIOLOGY | Facility: HOSPITAL | Age: 63
Discharge: HOME | End: 2023-03-09
Attending: NURSE PRACTITIONER
Payer: COMMERCIAL

## 2023-03-09 VITALS — WEIGHT: 151 LBS | HEIGHT: 62 IN | BODY MASS INDEX: 27.79 KG/M2

## 2023-03-09 DIAGNOSIS — Z12.31 ENCOUNTER FOR SCREENING MAMMOGRAM FOR MALIGNANT NEOPLASM OF BREAST: ICD-10-CM

## 2023-03-09 DIAGNOSIS — R73.03 PREDIABETES: Primary | ICD-10-CM

## 2023-03-09 LAB
ALBUMIN SERPL BCP-MCNC: 4 G/DL (ref 3.5–5.2)
ALP SERPL-CCNC: 102 U/L (ref 55–135)
ALT SERPL W/O P-5'-P-CCNC: 14 U/L (ref 10–44)
ANION GAP SERPL CALC-SCNC: 11 MMOL/L (ref 8–16)
AST SERPL-CCNC: 17 U/L (ref 10–40)
BASOPHILS # BLD AUTO: 0.03 K/UL (ref 0–0.2)
BASOPHILS NFR BLD: 0.7 % (ref 0–1.9)
BILIRUB SERPL-MCNC: 0.3 MG/DL (ref 0.1–1)
BUN SERPL-MCNC: 12 MG/DL (ref 6–20)
CALCIUM SERPL-MCNC: 10.2 MG/DL (ref 8.7–10.5)
CHLORIDE SERPL-SCNC: 107 MMOL/L (ref 95–110)
CHOLEST SERPL-MCNC: 245 MG/DL (ref 120–199)
CHOLEST/HDLC SERPL: 3.8 {RATIO} (ref 2–5)
CO2 SERPL-SCNC: 26 MMOL/L (ref 23–29)
CREAT SERPL-MCNC: 0.9 MG/DL (ref 0.5–1.4)
DIFFERENTIAL METHOD: ABNORMAL
EOSINOPHIL # BLD AUTO: 0.2 K/UL (ref 0–0.5)
EOSINOPHIL NFR BLD: 4.1 % (ref 0–8)
ERYTHROCYTE [DISTWIDTH] IN BLOOD BY AUTOMATED COUNT: 13.1 % (ref 11.5–14.5)
EST. GFR  (NO RACE VARIABLE): >60 ML/MIN/1.73 M^2
GLUCOSE SERPL-MCNC: 106 MG/DL (ref 70–110)
HCT VFR BLD AUTO: 41.5 % (ref 37–48.5)
HDLC SERPL-MCNC: 65 MG/DL (ref 40–75)
HDLC SERPL: 26.5 % (ref 20–50)
HGB BLD-MCNC: 12.6 G/DL (ref 12–16)
IMM GRANULOCYTES # BLD AUTO: 0 K/UL (ref 0–0.04)
IMM GRANULOCYTES NFR BLD AUTO: 0 % (ref 0–0.5)
LDLC SERPL CALC-MCNC: 148.2 MG/DL (ref 63–159)
LYMPHOCYTES # BLD AUTO: 1.6 K/UL (ref 1–4.8)
LYMPHOCYTES NFR BLD: 38.5 % (ref 18–48)
MCH RBC QN AUTO: 27.6 PG (ref 27–31)
MCHC RBC AUTO-ENTMCNC: 30.4 G/DL (ref 32–36)
MCV RBC AUTO: 91 FL (ref 82–98)
MONOCYTES # BLD AUTO: 0.3 K/UL (ref 0.3–1)
MONOCYTES NFR BLD: 7.7 % (ref 4–15)
NEUTROPHILS # BLD AUTO: 2 K/UL (ref 1.8–7.7)
NEUTROPHILS NFR BLD: 49 % (ref 38–73)
NONHDLC SERPL-MCNC: 180 MG/DL
NRBC BLD-RTO: 0 /100 WBC
PLATELET # BLD AUTO: 406 K/UL (ref 150–450)
PMV BLD AUTO: 9.9 FL (ref 9.2–12.9)
POTASSIUM SERPL-SCNC: 5.1 MMOL/L (ref 3.5–5.1)
PROT SERPL-MCNC: 8.1 G/DL (ref 6–8.4)
RBC # BLD AUTO: 4.57 M/UL (ref 4–5.4)
SODIUM SERPL-SCNC: 144 MMOL/L (ref 136–145)
TRIGL SERPL-MCNC: 159 MG/DL (ref 30–150)
TSH SERPL DL<=0.005 MIU/L-ACNC: 2.59 UIU/ML (ref 0.4–4)
WBC # BLD AUTO: 4.13 K/UL (ref 3.9–12.7)

## 2023-03-09 PROCEDURE — 77067 SCR MAMMO BI INCL CAD: CPT | Mod: TC

## 2023-03-09 PROCEDURE — 77067 MAMMO DIGITAL SCREENING BILAT WITH TOMO: ICD-10-PCS | Mod: 26,,, | Performed by: RADIOLOGY

## 2023-03-09 PROCEDURE — 77063 MAMMO DIGITAL SCREENING BILAT WITH TOMO: ICD-10-PCS | Mod: 26,,, | Performed by: RADIOLOGY

## 2023-03-09 PROCEDURE — 77067 SCR MAMMO BI INCL CAD: CPT | Mod: 26,,, | Performed by: RADIOLOGY

## 2023-03-09 PROCEDURE — 77063 BREAST TOMOSYNTHESIS BI: CPT | Mod: 26,,, | Performed by: RADIOLOGY

## 2023-03-20 ENCOUNTER — OFFICE VISIT (OUTPATIENT)
Dept: INTERNAL MEDICINE | Facility: CLINIC | Age: 63
End: 2023-03-20
Payer: COMMERCIAL

## 2023-03-20 ENCOUNTER — HOSPITAL ENCOUNTER (OUTPATIENT)
Dept: RADIOLOGY | Facility: HOSPITAL | Age: 63
Discharge: HOME | End: 2023-03-20
Attending: FAMILY MEDICINE
Payer: COMMERCIAL

## 2023-03-20 VITALS
TEMPERATURE: 98 F | HEIGHT: 62 IN | HEART RATE: 85 BPM | SYSTOLIC BLOOD PRESSURE: 126 MMHG | BODY MASS INDEX: 27.99 KG/M2 | OXYGEN SATURATION: 98 % | DIASTOLIC BLOOD PRESSURE: 82 MMHG | WEIGHT: 152.13 LBS

## 2023-03-20 DIAGNOSIS — M54.50 ACUTE EXACERBATION OF CHRONIC LOW BACK PAIN: ICD-10-CM

## 2023-03-20 DIAGNOSIS — M54.6 ACUTE MIDLINE THORACIC BACK PAIN: ICD-10-CM

## 2023-03-20 DIAGNOSIS — G89.29 ACUTE EXACERBATION OF CHRONIC LOW BACK PAIN: ICD-10-CM

## 2023-03-20 DIAGNOSIS — M54.6 ACUTE MIDLINE THORACIC BACK PAIN: Primary | ICD-10-CM

## 2023-03-20 PROCEDURE — 1159F MED LIST DOCD IN RCRD: CPT | Mod: CPTII,S$GLB,, | Performed by: FAMILY MEDICINE

## 2023-03-20 PROCEDURE — 3008F PR BODY MASS INDEX (BMI) DOCUMENTED: ICD-10-PCS | Mod: CPTII,S$GLB,, | Performed by: FAMILY MEDICINE

## 2023-03-20 PROCEDURE — 1160F RVW MEDS BY RX/DR IN RCRD: CPT | Mod: CPTII,S$GLB,, | Performed by: FAMILY MEDICINE

## 2023-03-20 PROCEDURE — 72070 X-RAY EXAM THORAC SPINE 2VWS: CPT | Mod: 26,,, | Performed by: RADIOLOGY

## 2023-03-20 PROCEDURE — 99999 PR PBB SHADOW E&M-EST. PATIENT-LVL IV: ICD-10-PCS | Mod: PBBFAC,,, | Performed by: FAMILY MEDICINE

## 2023-03-20 PROCEDURE — 3074F PR MOST RECENT SYSTOLIC BLOOD PRESSURE < 130 MM HG: ICD-10-PCS | Mod: CPTII,S$GLB,, | Performed by: FAMILY MEDICINE

## 2023-03-20 PROCEDURE — 3044F HG A1C LEVEL LT 7.0%: CPT | Mod: CPTII,S$GLB,, | Performed by: FAMILY MEDICINE

## 2023-03-20 PROCEDURE — 99999 PR PBB SHADOW E&M-EST. PATIENT-LVL IV: CPT | Mod: PBBFAC,,, | Performed by: FAMILY MEDICINE

## 2023-03-20 PROCEDURE — 72100 X-RAY EXAM L-S SPINE 2/3 VWS: CPT | Mod: TC

## 2023-03-20 PROCEDURE — 72070 XR THORACIC SPINE AP LATERAL: ICD-10-PCS | Mod: 26,,, | Performed by: RADIOLOGY

## 2023-03-20 PROCEDURE — 72070 X-RAY EXAM THORAC SPINE 2VWS: CPT | Mod: TC

## 2023-03-20 PROCEDURE — 3044F PR MOST RECENT HEMOGLOBIN A1C LEVEL <7.0%: ICD-10-PCS | Mod: CPTII,S$GLB,, | Performed by: FAMILY MEDICINE

## 2023-03-20 PROCEDURE — 3008F BODY MASS INDEX DOCD: CPT | Mod: CPTII,S$GLB,, | Performed by: FAMILY MEDICINE

## 2023-03-20 PROCEDURE — 99214 PR OFFICE/OUTPT VISIT, EST, LEVL IV, 30-39 MIN: ICD-10-PCS | Mod: 25,S$GLB,, | Performed by: FAMILY MEDICINE

## 2023-03-20 PROCEDURE — 3079F PR MOST RECENT DIASTOLIC BLOOD PRESSURE 80-89 MM HG: ICD-10-PCS | Mod: CPTII,S$GLB,, | Performed by: FAMILY MEDICINE

## 2023-03-20 PROCEDURE — 72100 XR LUMBAR SPINE AP AND LATERAL: ICD-10-PCS | Mod: 26,,, | Performed by: RADIOLOGY

## 2023-03-20 PROCEDURE — 3079F DIAST BP 80-89 MM HG: CPT | Mod: CPTII,S$GLB,, | Performed by: FAMILY MEDICINE

## 2023-03-20 PROCEDURE — 1160F PR REVIEW ALL MEDS BY PRESCRIBER/CLIN PHARMACIST DOCUMENTED: ICD-10-PCS | Mod: CPTII,S$GLB,, | Performed by: FAMILY MEDICINE

## 2023-03-20 PROCEDURE — 96372 THER/PROPH/DIAG INJ SC/IM: CPT | Mod: S$GLB,,, | Performed by: FAMILY MEDICINE

## 2023-03-20 PROCEDURE — 1159F PR MEDICATION LIST DOCUMENTED IN MEDICAL RECORD: ICD-10-PCS | Mod: CPTII,S$GLB,, | Performed by: FAMILY MEDICINE

## 2023-03-20 PROCEDURE — 99214 OFFICE O/P EST MOD 30 MIN: CPT | Mod: 25,S$GLB,, | Performed by: FAMILY MEDICINE

## 2023-03-20 PROCEDURE — 72100 X-RAY EXAM L-S SPINE 2/3 VWS: CPT | Mod: 26,,, | Performed by: RADIOLOGY

## 2023-03-20 PROCEDURE — 3074F SYST BP LT 130 MM HG: CPT | Mod: CPTII,S$GLB,, | Performed by: FAMILY MEDICINE

## 2023-03-20 PROCEDURE — 96372 PR INJECTION,THERAP/PROPH/DIAG2ST, IM OR SUBCUT: ICD-10-PCS | Mod: S$GLB,,, | Performed by: FAMILY MEDICINE

## 2023-03-20 RX ORDER — PREGABALIN 50 MG/1
50 CAPSULE ORAL 3 TIMES DAILY
COMMUNITY

## 2023-03-20 RX ORDER — KETOROLAC TROMETHAMINE 30 MG/ML
60 INJECTION, SOLUTION INTRAMUSCULAR; INTRAVENOUS
Status: COMPLETED | OUTPATIENT
Start: 2023-03-20 | End: 2023-03-20

## 2023-03-20 RX ADMIN — KETOROLAC TROMETHAMINE 60 MG: 30 INJECTION, SOLUTION INTRAMUSCULAR; INTRAVENOUS at 02:03

## 2023-03-22 ENCOUNTER — HOSPITAL ENCOUNTER (EMERGENCY)
Facility: HOSPITAL | Age: 63
Discharge: HOME | End: 2023-03-22
Attending: EMERGENCY MEDICINE
Payer: COMMERCIAL

## 2023-03-22 ENCOUNTER — TELEPHONE (OUTPATIENT)
Dept: FAMILY MEDICINE | Facility: CLINIC | Age: 63
End: 2023-03-22
Payer: COMMERCIAL

## 2023-03-22 VITALS
OXYGEN SATURATION: 100 % | RESPIRATION RATE: 20 BRPM | SYSTOLIC BLOOD PRESSURE: 195 MMHG | DIASTOLIC BLOOD PRESSURE: 84 MMHG | BODY MASS INDEX: 27.82 KG/M2 | HEART RATE: 98 BPM | TEMPERATURE: 98 F | HEIGHT: 62 IN

## 2023-03-22 DIAGNOSIS — M54.50 ACUTE LOW BACK PAIN WITHOUT SCIATICA, UNSPECIFIED BACK PAIN LATERALITY: Primary | ICD-10-CM

## 2023-03-22 PROCEDURE — 96372 THER/PROPH/DIAG INJ SC/IM: CPT | Performed by: NURSE PRACTITIONER

## 2023-03-22 PROCEDURE — 25000003 PHARM REV CODE 250: Performed by: NURSE PRACTITIONER

## 2023-03-22 PROCEDURE — 63600175 PHARM REV CODE 636 W HCPCS: Performed by: PHYSICIAN ASSISTANT

## 2023-03-22 PROCEDURE — 96372 THER/PROPH/DIAG INJ SC/IM: CPT | Performed by: PHYSICIAN ASSISTANT

## 2023-03-22 PROCEDURE — 63600175 PHARM REV CODE 636 W HCPCS: Performed by: NURSE PRACTITIONER

## 2023-03-22 PROCEDURE — 99285 EMERGENCY DEPT VISIT HI MDM: CPT | Mod: 25

## 2023-03-22 RX ORDER — ONDANSETRON 4 MG/1
4 TABLET, ORALLY DISINTEGRATING ORAL
Status: COMPLETED | OUTPATIENT
Start: 2023-03-22 | End: 2023-03-22

## 2023-03-22 RX ORDER — MORPHINE SULFATE 4 MG/ML
4 INJECTION, SOLUTION INTRAMUSCULAR; INTRAVENOUS
Status: COMPLETED | OUTPATIENT
Start: 2023-03-22 | End: 2023-03-22

## 2023-03-22 RX ORDER — ORPHENADRINE CITRATE 30 MG/ML
30 INJECTION INTRAMUSCULAR; INTRAVENOUS
Status: COMPLETED | OUTPATIENT
Start: 2023-03-22 | End: 2023-03-22

## 2023-03-22 RX ORDER — HYDROCODONE BITARTRATE AND ACETAMINOPHEN 5; 325 MG/1; MG/1
1 TABLET ORAL EVERY 6 HOURS PRN
Qty: 12 TABLET | Refills: 0 | Status: SHIPPED | OUTPATIENT
Start: 2023-03-22 | End: 2023-04-11

## 2023-03-22 RX ADMIN — MORPHINE SULFATE 4 MG: 4 INJECTION INTRAVENOUS at 07:03

## 2023-03-22 RX ADMIN — ONDANSETRON 4 MG: 4 TABLET, ORALLY DISINTEGRATING ORAL at 07:03

## 2023-03-22 RX ADMIN — ORPHENADRINE CITRATE 30 MG: 30 INJECTION INTRAMUSCULAR; INTRAVENOUS at 06:03

## 2023-03-22 NOTE — TELEPHONE ENCOUNTER
----- Message from Makayla Ray sent at 3/22/2023  9:56 AM CDT -----  Type:  Sooner Apoointment Request    Caller is requesting a sooner appointment.  Caller declined first available appointment listed below.  Caller will not accept being placed on the waitlist and is requesting a message be sent to doctor.  Name of Caller:pt  When is the first available appointment?unknown  Symptoms:follow up from  appt on 3/20  Would the patient rather a call back or a response via [facility]? call  Best Call Back Number:[phone]  Additional Information: Pt wants to discuss PT,

## 2023-03-22 NOTE — FIRST PROVIDER EVALUATION
" Emergency Department TeleTriage Encounter Note      CHIEF COMPLAINT    Chief Complaint   Patient presents with    Back Pain     Pt here with c/o mid-low back pain that worsened today. Pt received a steroid shot on Monday for this issue. States the pain came back and felt a "pop" this afternoon while bending over.        VITAL SIGNS   Initial Vitals [03/22/23 1729]   BP Pulse Resp Temp SpO2   (!) 195/84 98 20 98 °F (36.7 °C) 100 %      MAP       --            ALLERGIES    Review of patient's allergies indicates:  No Known Allergies    PROVIDER TRIAGE NOTE  Patient presents with low back pain. She had steroid IM Monday which helped, but today felt a pop in the back and now pain is worse.       ORDERS  Labs Reviewed - No data to display    ED Orders (720h ago, onward)      None              Virtual Visit Note: The provider triage portion of this emergency department evaluation and documentation was performed via [employer], a HIPAA-compliant telemedicine application, in concert with a tele-presenter in the room. A face to face patient evaluation with one of my colleagues will occur once the patient is placed in an emergency department room.      DISCLAIMER: This note was prepared with [employer] voice recognition transcription software. Garbled syntax, mangled pronouns, and other bizarre constructions may be attributed to that software system.    "

## 2023-03-23 ENCOUNTER — TELEPHONE (OUTPATIENT)
Dept: FAMILY MEDICINE | Facility: CLINIC | Age: 63
End: 2023-03-23
Payer: COMMERCIAL

## 2023-03-23 NOTE — TELEPHONE ENCOUNTER
----- Message from Aretha Patel sent at 3/22/2023  3:04 PM CDT -----  Contact: patient  Type:  Patient Call          Who Called: Patient         Does the patient know what this is regarding?: Requesting a call back pt said that she have back pain that caused her to fall and she can't stand ; please advise           Would the patient rather a call back or a response via [facility]?call           Best Call Back Number: [phone]             Additional Information:

## 2023-03-23 NOTE — ED PROVIDER NOTES
"     HISTORY     Chief Complaint   Patient presents with    Back Pain     Pt here with c/o mid-low back pain that worsened today. Pt received a steroid shot on Monday for this issue. States the pain came back and felt a "pop" this afternoon while bending over.      Review of patient's allergies indicates:  No Known Allergies     HPI   The history is provided by the patient. No  was used.   Back Pain   This is a new problem. The current episode started today. The problem occurs constantly. The problem has been unchanged. Associated with: felt a pop while bending over. The pain is present in the lumbar spine. Pertinent negatives include no chest pain, no fever, no numbness, no weight loss, no headaches, no abdominal pain, no abdominal swelling, no bowel incontinence, no perianal numbness, no bladder incontinence, no dysuria, no leg pain, no paresthesias, no paresis, no tingling and no weakness. She has tried nothing for the symptoms.      PCP: Theresa Patel MD     Past Medical History:  Past Medical History:   Diagnosis Date    Elevated blood pressure reading without diagnosis of hypertension     History of abnormal cervical Pap smear     x 1 with repeat normal - thus follow routine pap screening    Postmenopausal         Past Surgical History:  Past Surgical History:   Procedure Laterality Date    COLONOSCOPY N/A 6/12/2018    Procedure: COLONOSCOPY;  Surgeon: Carl Mora III, MD;  Location: [facility];  Service: Endoscopy;  Laterality: N/A;    None          Family History:  Family History   Problem Relation Age of Onset    Hypertension Mother     Anxiety disorder Mother     Hyperlipidemia Mother     Heart attack Father     Coronary artery disease Brother     Stomach cancer Paternal Grandfather     No Known Problems Brother     Anxiety disorder Daughter     No Known Problems Son     Diabetes Paternal Aunt     Thyroid disease Neg Hx         Social History:  Social History     Tobacco Use    " Smoking status: Never    Smokeless tobacco: Never   Substance and Sexual Activity    Alcohol use: Yes     Alcohol/week: 1.0 standard drink     Types: 1 Glasses of wine per week     Comment: rarely    Drug use: No    Sexual activity: Yes     Partners: Male     Birth control/protection: Post-menopausal         ROS   Review of Systems   Constitutional:  Negative for fever and weight loss.   HENT:  Negative for sore throat.    Respiratory:  Negative for shortness of breath.    Cardiovascular:  Negative for chest pain.   Gastrointestinal:  Negative for abdominal pain, bowel incontinence and nausea.   Genitourinary:  Negative for bladder incontinence and dysuria.   Musculoskeletal:  Positive for back pain.   Skin:  Negative for rash.   Neurological:  Negative for tingling, weakness, numbness, headaches and paresthesias.        No saddle anesthesia   No incontinence   Hematological:  Does not bruise/bleed easily.     PHYSICAL EXAM     Initial Vitals [03/22/23 1729]   BP Pulse Resp Temp SpO2   (!) 195/84 98 20 98 °F (36.7 °C) 100 %      MAP       --           Physical Exam    Nursing note and vitals reviewed.  Constitutional: She appears well-developed and well-nourished. She is not diaphoretic. No distress.   HENT:   Head: Normocephalic and atraumatic.   Eyes: Right eye exhibits no discharge. Left eye exhibits no discharge.   Neck: Neck supple.   Normal range of motion.  Cardiovascular:  Normal rate.           Pulmonary/Chest: No respiratory distress.   Abdominal: Abdomen is soft. She exhibits no distension. There is no abdominal tenderness.   Musculoskeletal:         General: Normal range of motion.      Cervical back: Normal range of motion and neck supple.     Neurological: She is alert and oriented to person, place, and time. She has normal strength.   Skin: Skin is warm and dry.   Psychiatric: She has a normal mood and affect. Her behavior is normal. Thought content normal.        ED COURSE   Procedures  ED ONGOING  "VITALS:  Vitals:    03/22/23 1729 03/22/23 1943   BP: (!) 195/84    Pulse: 98    Resp: 20 20   Temp: 98 °F (36.7 °C)    TempSrc: Oral    SpO2: 100%    Height: 5' 2" (1.575 m)          ABNORMAL LAB VALUES:  Labs Reviewed - No data to display      ALL LAB VALUES:  none      RADIOLOGY STUDIES:  Imaging Results              CT Lumbar Spine Without Contrast (Final result)  Result time 03/22/23 20:32:25      Final result by Subha Bustamante MD (03/22/23 20:32:25)                   Impression:      No acute osseous findings; multilevel spondylosis      Electronically signed by: Subha Bustamante  Date:    03/22/2023  Time:    20:32               Narrative:    EXAMINATION:  CT LUMBAR SPINE WITHOUT CONTRAST    CLINICAL HISTORY:  Low back pain, symptoms persist with > 6wks conservative treatment;    TECHNIQUE:  Low-dose axial, sagittal and coronal reformations are obtained through the lumbar spine.  Contrast was not administered.    Iterative technique automated exposure    COMPARISON:  Radiographic correlation    FINDINGS:  Straightening of normal lordosis.  Vertebral body heights grossly maintained.  Multilevel endplate spurring.  No acute fracture or traumatic malalignment identified.    Multilevel spondylosis overall moderate as visualized with CT soft tissue discrimination.                                                The above vital signs and test results have been reviewed by the emergency provider.     ED Medications:  Medications   orphenadrine injection 30 mg (30 mg Intramuscular Given 3/22/23 1816)   morphine injection 4 mg (4 mg Intramuscular Given 3/22/23 1943)   ondansetron disintegrating tablet 4 mg (4 mg Oral Given 3/22/23 1943)       Discharge Medication List as of 3/22/2023  9:10 PM        START taking these medications    Details   HYDROcodone-acetaminophen (NORCO) 5-325 mg per tablet Take 1 tablet by mouth every 6 (six) hours as needed for Pain., Starting Wed 3/22/2023, Print           Discharge " Medications:  Discharge Medication List as of 3/22/2023  9:10 PM        START taking these medications    Details   HYDROcodone-acetaminophen (NORCO) 5-325 mg per tablet Take 1 tablet by mouth every 6 (six) hours as needed for Pain., Starting Wed 3/22/2023, Print             Follow-up Information       [facility].    Specialty: Neurology  Why: As needed  Contact information:  [address] [facility] [address] [phone]  Additional information:  Please take [address] for the Medical Office Building. Check in on the 2nd floor.             [facility] Emergency [facility]..    Specialty: Emergency Medicine  Why: If symptoms worsen  Contact information:  [address] [facility] [address] [phone]                          I discussed with patient and/or family/caretaker that evaluation in the ED does not suggest any emergent or life threatening medical conditions requiring immediate intervention beyond what was provided in the ED, and I believe patient is safe for discharge. Regardless, an unremarkable evaluation in the ED does not preclude the development or presence of a serious or life threatening condition. As such, patient was instructed to return immediately for any worsening or change in current symptoms.        MEDICAL DECISION MAKING                 CLINICAL IMPRESSION       ICD-10-CM ICD-9-CM   1. Acute low back pain without sciatica, unspecified back pain laterality  M54.50 724.2       Disposition:   Disposition: Discharged  Condition: Stable       Javier Stevens NP  03/22/23 1701

## 2023-03-23 NOTE — TELEPHONE ENCOUNTER
Pt states she went to ER and they did a CT. She was prescribed pain meds and injection to help alleviate pain. Will call back if not feeling well over the weekend

## 2023-04-08 NOTE — PROGRESS NOTES
"Subjective:      Jenni Rush is a 62 y.o. female, here today with C/C of:  BACK PAIN      HPI:    Mrs. Rush is here with reports of back pain.  Last visit for back pain dates back to 2018, chronic intermittent issue.  Seen by Dr. Ag on 3/20/23 for acute midline T-pain and chronic L-pain.  Pain had started 5 days prior to that visit.  Tx with Toradol shot in office.  T-spine XR --- negative for any acute fracture or other significant chronic pathology.  LS-spine XR --- chronic degenerative changes of L4, L5, S1 to include facet arthritis, spondylosis, also noted sacroiliac degenerative changes.  Was advised to continue current rx ---- Lyrica, Lidocaine patch and Zanaflex.    Seen at [facility] on 3/22/23.  Pain had resolved until she bent over that day and heard a "pop" in her lower back.  CT of L-spine --- straightening of normal lordosis, vertebral body heights grossly maintained; multilevel endplate spurring.  No acute fracture or traumatic malalignment identified.  Multilevel spondylosis overall moderate.  Tx with Norflex, Zofran and Morphine in ER.  Given rx for Norco 5 mg tab prn.      Did have appt with Dr. Mejia on 4/4/23 ---- tx for chronic neck issues d/t WC injury.  Doing better over time but wanted to see him for 2nd opinion.  Her tx MD advised surgery which she was hesitant.      Lower midline back pain radiating to both sides of lower back.  Tx with Lyrica and lidocaine patch, minimal relief of pain.  No leg pain or sciatica reported.  Hard to get comfortable at night when sleeping.  Current pain 7/10, improved.      Review of Systems   Constitutional:  Positive for activity change (at times d/t pain).   Respiratory: Negative.     Cardiovascular: Negative.    Gastrointestinal: Negative.    Genitourinary: Negative.    Musculoskeletal:  Positive for back pain and neck pain. Negative for gait problem, joint swelling and neck stiffness.   Skin: Negative.    Neurological:  Negative for " "tremors, syncope, weakness, light-headedness and headaches.   Psychiatric/Behavioral:  Positive for sleep disturbance (d/t pain).        Review of patient's allergies indicates:  No Known Allergies      Patient Active Problem List   Diagnosis    Chronic constipation    Postmenopausal    Anxiety disorder    Decreased hearing    Arthropathy of cervical facet joint    Prolapsed cervical intervertebral disc         Current Outpatient Medications:     DULoxetine (CYMBALTA) 30 MG capsule, duloxetine 30 mg capsule,delayed release  TAKE 1 CAPSULE BY MOUTH EVERY DAY, Disp: , Rfl:     HYDROcodone-acetaminophen (NORCO) 5-325 mg per tablet, Take 1 tablet by mouth every 6 (six) hours as needed for Pain., Disp: 12 tablet, Rfl: 0    LIDODERM 5 %, 1 patch., Disp: , Rfl:     pregabalin (LYRICA) 50 MG capsule, Take 50 mg by mouth 3 (three) times daily., Disp: , Rfl:     tiZANidine (ZANAFLEX) 4 MG tablet, Take 4 mg by mouth every evening., Disp: , Rfl:     valACYclovir (VALTREX) 1000 MG tablet, Take 1,000 mg by mouth 2 (two) times daily., Disp: , Rfl:       Past medical, surgical, family and social histories have been reviewed today.      Objective:     Vitals:    04/11/23 0825   BP: 124/72   Pulse: 94   Temp: 96.4 °F (35.8 °C)   SpO2: 98%   Weight: 67.6 kg (149 lb 0.5 oz)   Height: 5' 2" (1.575 m)   PainSc:   7   PainLoc: Back       Physical Exam  Vitals reviewed.   Constitutional:       General: She is not in acute distress.  Eyes:      Pupils: Pupils are equal, round, and reactive to light.   Cardiovascular:      Rate and Rhythm: Normal rate and regular rhythm.      Pulses: Normal pulses.      Heart sounds: Normal heart sounds.   Pulmonary:      Effort: Pulmonary effort is normal.      Breath sounds: Normal breath sounds.   Musculoskeletal:         General: Normal range of motion.      Cervical back: Normal range of motion and neck supple. No rigidity.      Lumbar back: Spasms and bony tenderness present. No swelling, edema or " deformity. Normal range of motion. Negative right straight leg raise test and negative left straight leg raise test.        Back:       Right lower leg: No edema.      Left lower leg: No edema.   Skin:     Capillary Refill: Capillary refill takes less than 2 seconds.   Neurological:      Mental Status: She is alert and oriented to person, place, and time.      Sensory: No sensory deficit.      Motor: No weakness.      Coordination: Coordination normal.      Gait: Gait normal.   Psychiatric:         Mood and Affect: Mood normal.         Behavior: Behavior normal.         Thought Content: Thought content normal.         Judgment: Judgment normal.       Diagnosis/Assessment:     1. Midline low back pain without sciatica, unspecified chronicity  Ambulatory referral/consult to Physical/Occupational Therapy    Back injury 3/22/23, gradually improving.  To PT, advised someone will contact her to schedule appt for the [place].  Continue tizanidine, lidocaine patches, topical pain creams/rubs.  Tylenol and/or Advil prn.  Acupuncture may help.          Follow-up:     RTC as directed and/or prn.        SELENA Cuellar  [facility]       30 minutes of total time spent on the encounter, which includes face to face time and non-face to face time preparing to see the patient.  This includes obtaining and/or reviewing separately obtained history, performing a medically appropriate examination and/or evaluation, and counseling and educating the patient/family/caregiver.  Includes documenting clinical information in the electronic or other health record, independently interpreting results (not separately reported) and communicating results to the patient/family/caregiver, with care coordination (not separately reported).  Medications, tests and/or procedures ordered as necessary along with referring and communicating with other health professionals (when not separately reported).

## 2023-04-11 ENCOUNTER — OFFICE VISIT (OUTPATIENT)
Dept: FAMILY MEDICINE | Facility: CLINIC | Age: 63
End: 2023-04-11
Payer: COMMERCIAL

## 2023-04-11 VITALS
BODY MASS INDEX: 27.43 KG/M2 | HEART RATE: 94 BPM | DIASTOLIC BLOOD PRESSURE: 72 MMHG | WEIGHT: 149.06 LBS | TEMPERATURE: 96 F | OXYGEN SATURATION: 98 % | SYSTOLIC BLOOD PRESSURE: 124 MMHG | HEIGHT: 62 IN

## 2023-04-11 DIAGNOSIS — M54.50 MIDLINE LOW BACK PAIN WITHOUT SCIATICA, UNSPECIFIED CHRONICITY: Primary | ICD-10-CM

## 2023-04-11 PROBLEM — M54.42 ACUTE MIDLINE LOW BACK PAIN WITH LEFT-SIDED SCIATICA: Status: RESOLVED | Noted: 2018-07-31 | Resolved: 2023-04-11

## 2023-04-11 PROBLEM — M47.812 ARTHROPATHY OF CERVICAL FACET JOINT: Status: ACTIVE | Noted: 2023-04-11

## 2023-04-11 PROBLEM — M50.20 PROLAPSED CERVICAL INTERVERTEBRAL DISC: Status: ACTIVE | Noted: 2023-04-11

## 2023-04-11 PROCEDURE — 1159F PR MEDICATION LIST DOCUMENTED IN MEDICAL RECORD: ICD-10-PCS | Mod: CPTII,S$GLB,, | Performed by: REGISTERED NURSE

## 2023-04-11 PROCEDURE — 3078F DIAST BP <80 MM HG: CPT | Mod: CPTII,S$GLB,, | Performed by: REGISTERED NURSE

## 2023-04-11 PROCEDURE — 3074F SYST BP LT 130 MM HG: CPT | Mod: CPTII,S$GLB,, | Performed by: REGISTERED NURSE

## 2023-04-11 PROCEDURE — 99999 PR PBB SHADOW E&M-EST. PATIENT-LVL IV: ICD-10-PCS | Mod: PBBFAC,,, | Performed by: REGISTERED NURSE

## 2023-04-11 PROCEDURE — 3008F PR BODY MASS INDEX (BMI) DOCUMENTED: ICD-10-PCS | Mod: CPTII,S$GLB,, | Performed by: REGISTERED NURSE

## 2023-04-11 PROCEDURE — 3074F PR MOST RECENT SYSTOLIC BLOOD PRESSURE < 130 MM HG: ICD-10-PCS | Mod: CPTII,S$GLB,, | Performed by: REGISTERED NURSE

## 2023-04-11 PROCEDURE — 1159F MED LIST DOCD IN RCRD: CPT | Mod: CPTII,S$GLB,, | Performed by: REGISTERED NURSE

## 2023-04-11 PROCEDURE — 3044F PR MOST RECENT HEMOGLOBIN A1C LEVEL <7.0%: ICD-10-PCS | Mod: CPTII,S$GLB,, | Performed by: REGISTERED NURSE

## 2023-04-11 PROCEDURE — 3008F BODY MASS INDEX DOCD: CPT | Mod: CPTII,S$GLB,, | Performed by: REGISTERED NURSE

## 2023-04-11 PROCEDURE — 3044F HG A1C LEVEL LT 7.0%: CPT | Mod: CPTII,S$GLB,, | Performed by: REGISTERED NURSE

## 2023-04-11 PROCEDURE — 99214 PR OFFICE/OUTPT VISIT, EST, LEVL IV, 30-39 MIN: ICD-10-PCS | Mod: S$GLB,,, | Performed by: REGISTERED NURSE

## 2023-04-11 PROCEDURE — 99999 PR PBB SHADOW E&M-EST. PATIENT-LVL IV: CPT | Mod: PBBFAC,,, | Performed by: REGISTERED NURSE

## 2023-04-11 PROCEDURE — 99214 OFFICE O/P EST MOD 30 MIN: CPT | Mod: S$GLB,,, | Performed by: REGISTERED NURSE

## 2023-04-11 PROCEDURE — 3078F PR MOST RECENT DIASTOLIC BLOOD PRESSURE < 80 MM HG: ICD-10-PCS | Mod: CPTII,S$GLB,, | Performed by: REGISTERED NURSE

## 2023-04-11 RX ORDER — DULOXETIN HYDROCHLORIDE 30 MG/1
CAPSULE, DELAYED RELEASE ORAL
COMMUNITY

## 2023-04-18 ENCOUNTER — CLINICAL SUPPORT (OUTPATIENT)
Dept: REHABILITATION | Facility: HOSPITAL | Age: 63
End: 2023-04-18
Payer: COMMERCIAL

## 2023-04-18 DIAGNOSIS — Z74.09 DECREASED FUNCTIONAL MOBILITY AND ENDURANCE: ICD-10-CM

## 2023-04-18 DIAGNOSIS — M25.60 DECREASED RANGE OF MOTION: ICD-10-CM

## 2023-04-18 DIAGNOSIS — M54.50 MIDLINE LOW BACK PAIN WITHOUT SCIATICA, UNSPECIFIED CHRONICITY: ICD-10-CM

## 2023-04-18 DIAGNOSIS — R53.1 DECREASED STRENGTH: ICD-10-CM

## 2023-04-18 PROCEDURE — 97163 PT EVAL HIGH COMPLEX 45 MIN: CPT

## 2023-04-18 NOTE — PLAN OF CARE
[facility]   Physical Therapy Initial Evaluation   Date: 4/18/2023   Name: Jenni [facility] Number: [id number]    Therapy Diagnosis:   Encounter Diagnoses   Name Primary?    Midline low back pain without sciatica, unspecified chronicity     Decreased range of motion     Decreased strength     Decreased functional mobility and endurance       Physician: Rolando Rea NP     Physician Orders: Physical Therapy Evaluation and Treat  Medical Diagnosis from Referral: M54.50 (ICD-10-CM) - Midline low back pain without sciatica, unspecified chronicity  Evaluation Date: 4/18/2023  Authorization Period Expiration: 12/31/2023  Plan of Care Expiration: 07/11/2023  Progress Note Due: 05/18/2023  Visit # / Visits authorized: 1/1   FOTO: 1/3     Precautions: Standard; Anxiety: High Blood Pressure with Increased Pain     Time In: 7:00 AM  Time Out: 7:40 AM  Total Billable Time (timed & untimed codes): 40 minutes    SUBJECTIVE   Date of onset: Approximately 2-3 weeks ago    *History of current condition - Jenni reports: her low back pain started a few weeks ago when she went to start a bath and shower and she reached and bent over for her shower head and instantly felt a sharp pain and fell to the ground. She immediately went to the emergency room and had imaging done, and it showed nothing wrong. Over the past week her pain was getting better. However, yesterday she reported reaching out for a leaf and the same pain happened again to where she fell down. She reports she feels she is back to the same place she was a few weeks ago. Patient states she now needs her  to help assist her in and out of bed and cannot drive because of her pain.     Patient states due to a high co pay she may not be able to come to PHYSICAL THERAPY more than once per week.    *Patient presented in forward flexed position during history and seemed exteremly uncomfortable during entirety of  visit.    Falls: yes, due to MECHANISM OF INJURY. See above    Exercise Regimen: none    Imaging: [x] Xray [x] MRI [] CT: Performed on: 03/20/2023 and 03/22/2023     Pain:  Current 10/10, worst 10/10, best 4/10   Location: [] Right   [] Left:  Center of lumbar spine and radiating outwards  Description: Aching, Throbbing, Tight, Deep, and Sharp  Aggravating Factors: almost all movements   Easing Factors: nothing    Prior Therapy:   [x] N/A    [] Yes:   Social History: Patient lives with their spouse  Occupation: Patient is (not asked today)  Prior Level of Function: Independent and pain free with all ADL, IADL, community mobility and functional activities.   Current Level of Function: MODIFIED - Independent with all ADL, IADL, community mobility and functional activities with reports of increased pain and need for increased time and frequent breaks.      Dominant Extremity:    [x] Right    [] Left      Medical History:   Past Medical History:   Diagnosis Date    Elevated blood pressure reading without diagnosis of hypertension     History of abnormal cervical Pap smear     x 1 with repeat normal - thus follow routine pap screening    Postmenopausal        Surgical History:   Jenni Rush  has a past surgical history that includes None and Colonoscopy (N/A, 6/12/2018).    Medications:   Jenni has a current medication list which includes the following prescription(s): duloxetine, lidoderm, pregabalin, tizanidine, and valacyclovir.    Allergies:   Review of patient's allergies indicates:  No Known Allergies       OBJECTIVE     Sensation:  [x] Intact to Light Touch   [] Impaired:    Palpation: Increased tone and tenderness noted with palpation of lumbar spinous process as well as all surrounding tissues and muscles. Unable to provide patient with more than light touch pressure due to pain and sensitivity.    Posture: Patient presents with postural abnormalities which include:    [x] Forward Head   [x]  Increased Lumbar Lordosis   [x] Rounded Shoulder   [] Genu Recurvatum   [x] Increased Thoracic Kyphosis [] Genu Valgus   [] Trunk Deviated    [] Pes Planus   [] Scapular Winging    [] Other:     Gait Analysis: The patient ambulated with the following assistive device: none; the patient presents with the following gait abnormalities: severely forward flexed with bilateral trendelenburg and greatly decreased gait speed      Functional Movement  Analysis   Sit  to stand Painful  and Dysfunctional       RANGE OF MOTION:    Unable to assess due to patient's pain and patient denied assessment even in sitting position. Overall RANGE OF MOTION assumed to be severely limited in lumbar spine as well as bilateral hip     STRENGTH:    L/E MMT Right  (spine) Left Pain/Dysfunction with Movement Goal   Hip Flexion  3-/5 3-/5 Severe low back pain 4+/5 Bilateral   Knee Extension 3-/5 3-/5 Severe low back pain 5/5 Bilateral   Knee Flexion 3-/5 3-/5 Severe low back pain 5/5 Bilateral   Ankle Dorsiflexion 3+/5 3+/5 Severe low back pain 5/5 Bilateral   Ankle Plantarflexion 3/5 3/5 Severe low back pain 5/5 Bilateral       MUSCLE LENGTH:     Muscle Tested  Right Left  Goal   Hip Flexors decreased decreased Normal Bilateral   Quadriceps decreased decreased Normal Bilateral   Hamstrings  decreased decreased Normal Bilateral   Piriformis  decreased decreased Normal Bilateral   Gastrocnemius  decreased decreased Normal Bilateral   Soleus  decreased decreased Normal Bilateral       JOINT MOBILITY:     Unable to assess due to pain    FUNCTION:     CMS Impairment/Limitation/Restriction for FOTO Lumbar NOC Survey    Therapist reviewed FOTO scores for Jenni on 4/18/2023.   FOTO documents entered into [address] - see Media section.    Limitation Score: 73%         TREATMENT       Jenni received the treatments listed below:      THERAPEUTIC EXERCISES to develop strength, endurance, ROM, flexibility, posture, and core stabilization for (0) minutes  including:    Intervention Performed Today                                              Plan for Next Visit: Nu-step, posterior pelvic tilts, diaphragm breathing, lower trunk rotations, supine ball squeeze, supine belt squeeze, ball roll outs, chronic pain education       PATIENT EDUCATION AND HOME EXERCISES     Education provided: (20) minutes  PURPOSE: Patient educated on the impairments noted above and the effects of physical therapy intervention to improve overall condition and QOL.   EXERCISE: Patient was educated on all the above exercise prior/during/after for proper posture, positioning, and execution for safe performance with home exercise program.   STRENGTH: Patient educated on the importance of improved core and extremity strength in order to improve alignment of the spine and extremities with static positions and dynamic movement.   Patient educated that because her symptoms are so flared up to wait one week for pain to subside to less than 10/10 and that if they do not, to cancel her next PHYSICAL THERAPY and go to the emergency room. PHYSICAL THERAPY verbalized understanding.     Written Home Exercises Provided: no.  Exercises were reviewed and Jenni was able to demonstrate them prior to the end of the session.  Jenni demonstrated good  understanding of the education provided. See EMR under Patient Instructions for exercises provided during therapy sessions.    ASSESSMENT     Jenni is a 62 y.o. female referred to outpatient Physical Therapy with a medical diagnosis of midline low back pain without sciatica, unspecified chronicity. Patient presents with impairments including: impairments list: ROM, strength, endurance, joint mobility, muscle length, balance, posture, gait mechanics, core strength and stability, and functional movement patterns.    Patient prognosis is Guarded.   Patient will benefit from skilled outpatient Physical Therapy to address the deficits stated above and in the chart  "below, provide patient/family education, and to maximize patient's level of independence.     Plan of care discussed with patient: Yes  Patient's spiritual, cultural and educational needs considered and patient is agreeable to the plan of care and goals as stated below:     Anticipated Barriers for therapy: co-morbidities, sedentary lifestyle, lack of understanding of condition, adherence to treatment plan, insurance coverage, and transportation    Medical Necessity is demonstrated by the following  History  Co-morbidities and personal factors that may impact the plan of care Co-morbidities:   depression, high BMI, and HTN    Personal Factors:   age     high   Examination  Body Structures and Functions, activity limitations and participation restrictions that may impact the plan of care Body Regions:   back  lower extremities  trunk    Body Systems:    gross symmetry  ROM  strength  gross coordinated movement  balance  gait  transfers    Participation Restrictions:   See above in "Current Level of Function"     Activity limitations:   Learning and applying knowledge  no deficits    General Tasks and Commands  no deficits    Communication  no deficits    Mobility  lifting and carrying objects  fine hand use (grasping/picking up)  walking  moving around using equipment (WC)  using transportation (bus, train, plane, car)  driving (bike, car, motorcycle)    Self care  washing oneself (bathing, drying, washing hands)  caring for body parts (brushing teeth, shaving, grooming)  toileting  dressing  eating    Domestic Life  shopping  cooking  doing house work (cleaning house, washing dishes, laundry)  assisting others    Interactions/Relationships  no deficits    Life Areas  no deficits    Community and Social Life  community life  recreation and leisure         high   Clinical Presentation unstable clinical presentation with unpredictable characteristics high   Decision Making/ Complexity Score: high     GOALS:    Short " Term Goals:  6 weeks Status  Date Met   PAIN: Patient will report worst pain of 6/10 in order to progress toward max functional ability and improve quality of life. [x] Progressing  [] Met  [] Not Met    FUNCTION: Patient will demonstrate improved function as indicated by a functional limitation score of less than or equal to 55 out of 100 on FOTO. [x] Progressing  [] Met  [] Not Met    STRENGTH: Patient will improve strength to 50% of stated goals, listed in objective measures above, in order to progress towards independence with functional activities.  [x] Progressing  [] Met  [] Not Met    POSTURE: Patient will correct postural deviations in sitting and standing, to decrease pain and promote long term stability.  [x] Progressing  [] Met  [] Not Met    GAIT: Patient will demonstrate improved gait mechanics in order to improve functional mobility, improve quality of life, and decrease risk of further injury or fall.  [x] Progressing  [] Met  [] Not Met      Long Term Goals:  12 weeks Status Date Met   PAIN: Patient will report worst pain of 4/10 in order to progress toward max functional ability and improve quality of life [x] Progressing  [] Met  [] Not Met    FUNCTION: Patient will demonstrate improved function as indicated by a functional limitation score of less than or equal to 47 out of 100 on FOTO. [x] Progressing  [] Met  [] Not Met    STRENGTH: Patient will improve strength to stated goals, listed in objective measures above, in order to improve functional independence and quality of life. [x] Progressing  [] Met  [] Not Met    GAIT: Patient will demonstrate normalized gait mechanics with minimal compensation in order to return to prior level of function. [x] Progressing  [] Met  [] Not Met    Patient will return to normal ADL's, IADL's, community involvement, recreational activities, and work-related activities with less than or equal to 4/10 pain and maximal function.  [x] Progressing  [] Met  [] Not Met       PLAN   Plan of Care Certification: 4/18/2023 to 07/11/2023.    Outpatient Physical Therapy 2 times weekly for 12 weeks to include any combination of the following interventions: virtual visits, dry needling, modalities, electrical stimulation (IFC, Pre-Mod, Attended with Functional Dry Needling), Cervical/Lumbar Traction, Gait Training, Manual Therapy, Moist Heat/ Ice, Neuromuscular Re-ed, Patient Education, Self Care, Therapeutic Exercise, and Therapeutic Activites     Mireya Moreno, PT, DPT      I CERTIFY THE NEED FOR THESE SERVICES FURNISHED UNDER THIS PLAN OF TREATMENT AND WHILE UNDER MY CARE   Physician's comments:     Physician's Signature: ___________________________________________________

## 2023-04-25 ENCOUNTER — CLINICAL SUPPORT (OUTPATIENT)
Dept: REHABILITATION | Facility: HOSPITAL | Age: 63
End: 2023-04-25
Payer: COMMERCIAL

## 2023-04-25 DIAGNOSIS — R53.1 DECREASED STRENGTH: ICD-10-CM

## 2023-04-25 DIAGNOSIS — M25.60 DECREASED RANGE OF MOTION: Primary | ICD-10-CM

## 2023-04-25 DIAGNOSIS — Z74.09 DECREASED FUNCTIONAL MOBILITY AND ENDURANCE: ICD-10-CM

## 2023-04-25 PROCEDURE — 97530 THERAPEUTIC ACTIVITIES: CPT

## 2023-04-25 PROCEDURE — 97110 THERAPEUTIC EXERCISES: CPT

## 2023-04-25 PROCEDURE — 97112 NEUROMUSCULAR REEDUCATION: CPT

## 2023-04-25 NOTE — PROGRESS NOTES
"[facility]   Physical Therapy Treatment Note     Name: Jenni Sotomayor [facility] Number: [id number]    Therapy Diagnosis:   Encounter Diagnoses   Name Primary?    Decreased range of motion Yes    Decreased strength     Decreased functional mobility and endurance      Physician: Rolando Rea NP    Visit Date: 4/25/2023    Physician Orders: Physical Therapy Evaluation and Treat  Medical Diagnosis from Referral: M54.50 (ICD-10-CM) - Midline low back pain without sciatica, unspecified chronicity  Evaluation Date: 4/18/2023  Authorization Period Expiration: 12/31/2023  Plan of Care Expiration: 07/11/2023  Progress Note Due: 05/18/2023  Visit # / Visits authorized: 1/12  FOTO: 1/3      Precautions: Standard; Anxiety: High Blood Pressure with Increased Pain     PTA Visit #: 0/5     Time In: 8:36 AM  Time Out: 9:15 AM  Total Billable Time: 39 minutes    SUBJECTIVE     Patient reports: she is doing much better today than in the initial evaluation. Patient does state she is still scared to do "too much too early."   She (N/A) compliant with home exercise program.  Response to previous treatment: better since initial evaluation   Functional change: low back pain with most ACTIVITIES OF DAILY LIVING, pain with transfers, pain with upper extremity reaching    Pain: 5/10  Location: Low Back/ Anterior Bilateral Groin in Belt Distribution     OBJECTIVE     Objective Measures updated at progress report unless specified.       Treatment     Jenni received the treatments listed below:      THERAPEUTIC EXERCISES to develop strength, endurance, ROM, flexibility, posture, and core stabilization for (9) minutes including:     Intervention Performed Today     Recumbent Bike  x  5 minutes Level 1   Lower Trunk Rotations  x  3 minutes                                                      Plan for Next Visit: progress as tolerated      THERAPEUTIC ACTIVITIES: to improve functional performance through " dynamic activities, for (15)  minutes including:   x = performed today    Therapeutic Activities 4/25/2023    Supine Ball Squeeze x 3 minutes with 3 second holds   Supine Belt Squeeze x 3 minutes with 3 second holds   Lumbar Ball Roll Outs x 3 minutes 3 ways   Sit to Stands      BOLD = new this visit  Plan for Next Visit: progress as tolerated       NEUROMUSCULAR RE-EDUCATION activities to improve: Coordination, Kinesthetic, Sense, and Proprioception for (15) minutes. The following activities were included: x = exercises performed     Neuromuscular Re-Education 4/25/2023    Posterior Pelvic Tilts x 3 minutes with 3 second holds   Clamshells x 3x10 bilateral   Supine Glute Squeezes x X30 with 3 second holds   Bridges      BOLD= new this visit  Plan for Next Visit: progress as tolerated        Patient Education and Home Exercises     Home Exercises Provided and Patient Education Provided     Education provided:   PURPOSE: Patient educated on the impairments noted above and the effects of physical therapy intervention to improve overall condition and QOL.   EXERCISE: Patient was educated on all the above exercise prior/during/after for proper posture, positioning, and execution for safe performance with home exercise program.   STRENGTH: Patient educated on the importance of improved core and extremity strength in order to improve alignment of the spine and extremities with static positions and dynamic movement.   Patient educated that because her symptoms are so flared up to wait one week for pain to subside to less than 10/10 and that if they do not, to cancel her next PHYSICAL THERAPY and go to the emergency room. PHYSICAL THERAPY verbalized understanding.     Written Home Exercises Provided: Patient instructed to cont prior HEP. Exercises were reviewed and Jenni was able to demonstrate them prior to the end of the session.  Jenni demonstrated good  understanding of the education provided. See EMR under  Patient Instructions for exercises provided during therapy sessions    ASSESSMENT   Patient tolerated first treatment session well. Patient was given supine exercises focusing on core and hip stability and strengthening. Patient with good form and only minimal verbal and tactile cues needed. Plan to continue monitoring symptoms and progressing towards functional activities and strength as tolerated. No adverse effects noted.    Jenni Is progressing well towards her goals.   Patient prognosis is Good.     Patient will continue to benefit from skilled outpatient physical therapy to address the deficits listed in the problem list box on initial evaluation, provide patient/family education and to maximize patient's level of independence in the home and community environment.     Patient's spiritual, cultural and educational needs considered and patient agreeable to plan of care and goals.     Anticipated barriers to physical therapy: co-morbidities, sedentary lifestyle, lack of understanding of condition, adherence to treatment plan, insurance coverage, and transportation    GOALS:  Reviewed: 04/25/2023     Short Term Goals:  6 weeks Status  Date Met   PAIN: Patient will report worst pain of 6/10 in order to progress toward max functional ability and improve quality of life. [x] Progressing  [] Met  [] Not Met     FUNCTION: Patient will demonstrate improved function as indicated by a functional limitation score of less than or equal to 55 out of 100 on FOTO. [x] Progressing  [] Met  [] Not Met     STRENGTH: Patient will improve strength to 50% of stated goals, listed in objective measures above, in order to progress towards independence with functional activities.  [x] Progressing  [] Met  [] Not Met     POSTURE: Patient will correct postural deviations in sitting and standing, to decrease pain and promote long term stability.  [x] Progressing  [] Met  [] Not Met     GAIT: Patient will demonstrate improved gait  mechanics in order to improve functional mobility, improve quality of life, and decrease risk of further injury or fall.  [x] Progressing  [] Met  [] Not Met        Long Term Goals:  12 weeks Status Date Met   PAIN: Patient will report worst pain of 4/10 in order to progress toward max functional ability and improve quality of life [x] Progressing  [] Met  [] Not Met     FUNCTION: Patient will demonstrate improved function as indicated by a functional limitation score of less than or equal to 47 out of 100 on FOTO. [x] Progressing  [] Met  [] Not Met     STRENGTH: Patient will improve strength to stated goals, listed in objective measures above, in order to improve functional independence and quality of life. [x] Progressing  [] Met  [] Not Met     GAIT: Patient will demonstrate normalized gait mechanics with minimal compensation in order to return to prior level of function. [x] Progressing  [] Met  [] Not Met     Patient will return to normal ADL's, IADL's, community involvement, recreational activities, and work-related activities with less than or equal to 4/10 pain and maximal function.  [x] Progressing  [] Met  [] Not Met          PLAN     Continue Plan of Care (POC) and progress per patient tolerance. See treatment section for details on planned progressions next session.    Plan of Care Due: 07/11/2023    Mireya Moreno, PT

## 2023-05-01 ENCOUNTER — CLINICAL SUPPORT (OUTPATIENT)
Dept: REHABILITATION | Facility: HOSPITAL | Age: 63
End: 2023-05-01
Payer: COMMERCIAL

## 2023-05-01 DIAGNOSIS — R53.1 DECREASED STRENGTH: ICD-10-CM

## 2023-05-01 DIAGNOSIS — Z74.09 DECREASED FUNCTIONAL MOBILITY AND ENDURANCE: ICD-10-CM

## 2023-05-01 DIAGNOSIS — M25.60 DECREASED RANGE OF MOTION: Primary | ICD-10-CM

## 2023-05-01 PROCEDURE — 97530 THERAPEUTIC ACTIVITIES: CPT

## 2023-05-01 PROCEDURE — 97112 NEUROMUSCULAR REEDUCATION: CPT

## 2023-05-01 PROCEDURE — 97110 THERAPEUTIC EXERCISES: CPT

## 2023-05-01 NOTE — PROGRESS NOTES
[facility]   Physical Therapy Treatment Note     Name: Jenni Sotomayor [facility] Number: [id number]    Therapy Diagnosis:   Encounter Diagnoses   Name Primary?    Decreased range of motion Yes    Decreased strength     Decreased functional mobility and endurance      Physician: Rolando Rea NP    Visit Date: 5/1/2023    Physician Orders: Physical Therapy Evaluation and Treat  Medical Diagnosis from Referral: M54.50 (ICD-10-CM) - Midline low back pain without sciatica, unspecified chronicity  Evaluation Date: 4/18/2023  Authorization Period Expiration: 12/31/2023  Plan of Care Expiration: 07/11/2023  Progress Note Due: 05/18/2023  Visit # / Visits authorized: 2/12  FOTO: 1/3      Precautions: Standard; Anxiety: High Blood Pressure with Increased Pain     PTA Visit #: 0/5     Time In: 7:00 AM  Time Out: 7:40 AM  Total Billable Time: 40 minutes    SUBJECTIVE     Patient reports: she is doing even better today than she was last visit and is getting better.  She (N/A) compliant with home exercise program.  Response to previous treatment: good  Functional change: decreased low back pain with most ACTIVITIES OF DAILY LIVING, with transfers, and with upper extremity reaching    Pain: 3-4/10  Location: Low Back/ Anterior Bilateral Groin in Belt Distribution     OBJECTIVE     Objective Measures updated at progress report unless specified.     Treatment     Jenni received the treatments listed below:      THERAPEUTIC EXERCISES to develop strength, endurance, ROM, flexibility, posture, and core stabilization for (9) minutes including:     Intervention Performed Today     Recumbent Bike  x  5 minutes Level 1   Lower Trunk Rotations  x  3 minutes                                                      Plan for Next Visit: progress as tolerated      THERAPEUTIC ACTIVITIES: to improve functional performance through dynamic activities, for (18)  minutes including:   x = performed  today    Therapeutic Activities 5/1/2023    Supine Ball Squeeze x 3 minutes with 3 second holds   Supine Belt Squeeze x 3 minutes with 3 second holds   Lumbar Ball Roll Outs x 3 minutes 3 ways   Sit to Stands x 2x10   Shuttle Squat x  3 Bands, 3 minutes    BOLD = new this visit  Plan for Next Visit: progress as tolerated     NEUROMUSCULAR RE-EDUCATION activities to improve: Coordination, Kinesthetic, Sense, and Proprioception for (15) minutes. The following activities were included: x = exercises performed     Neuromuscular Re-Education 5/1/2023    Posterior Pelvic Tilts x 3 minutes with 3 second holds   Clamshells x 3x10 bilateral   Supine Glute Squeezes  X30 with 3 second holds   Bridges x 2x10    BOLD= new this visit  Plan for Next Visit: progress as tolerated        Patient Education and Home Exercises     Home Exercises Provided and Patient Education Provided     Education provided:   PURPOSE: Patient educated on the impairments noted above and the effects of physical therapy intervention to improve overall condition and QOL.   EXERCISE: Patient was educated on all the above exercise prior/during/after for proper posture, positioning, and execution for safe performance with home exercise program.   STRENGTH: Patient educated on the importance of improved core and extremity strength in order to improve alignment of the spine and extremities with static positions and dynamic movement.   Patient educated that because her symptoms are so flared up to wait one week for pain to subside to less than 10/10 and that if they do not, to cancel her next PHYSICAL THERAPY and go to the emergency room. PHYSICAL THERAPY verbalized understanding.     Written Home Exercises Provided: Patient instructed to cont prior HEP. Exercises were reviewed and Jenni was able to demonstrate them prior to the end of the session.  Jenni demonstrated good  understanding of the education provided. See EMR under Patient Instructions for  exercises provided during therapy sessions    ASSESSMENT   Patient tolerated session well, but did reports some cardiovascular fatigue with recumbent bike today. Progressed patient from glute squeezes to glute bridges and progressed to sit to stands and shuttle squats with good tolerance. Plan to continue progressing strength and function as patient tolerates. No adverse effects noted.    Jenni Is progressing well towards her goals.   Patient prognosis is Good.     Patient will continue to benefit from skilled outpatient physical therapy to address the deficits listed in the problem list box on initial evaluation, provide patient/family education and to maximize patient's level of independence in the home and community environment.     Patient's spiritual, cultural and educational needs considered and patient agreeable to plan of care and goals.     Anticipated barriers to physical therapy: co-morbidities, sedentary lifestyle, lack of understanding of condition, adherence to treatment plan, insurance coverage, and transportation    GOALS:  Reviewed: 05/01/2023     Short Term Goals:  6 weeks Status  Date Met   PAIN: Patient will report worst pain of 6/10 in order to progress toward max functional ability and improve quality of life. [x] Progressing  [] Met  [] Not Met     FUNCTION: Patient will demonstrate improved function as indicated by a functional limitation score of less than or equal to 55 out of 100 on FOTO. [x] Progressing  [] Met  [] Not Met     STRENGTH: Patient will improve strength to 50% of stated goals, listed in objective measures above, in order to progress towards independence with functional activities.  [x] Progressing  [] Met  [] Not Met     POSTURE: Patient will correct postural deviations in sitting and standing, to decrease pain and promote long term stability.  [x] Progressing  [] Met  [] Not Met     GAIT: Patient will demonstrate improved gait mechanics in order to improve functional  mobility, improve quality of life, and decrease risk of further injury or fall.  [x] Progressing  [] Met  [] Not Met        Long Term Goals:  12 weeks Status Date Met   PAIN: Patient will report worst pain of 4/10 in order to progress toward max functional ability and improve quality of life [x] Progressing  [] Met  [] Not Met     FUNCTION: Patient will demonstrate improved function as indicated by a functional limitation score of less than or equal to 47 out of 100 on FOTO. [x] Progressing  [] Met  [] Not Met     STRENGTH: Patient will improve strength to stated goals, listed in objective measures above, in order to improve functional independence and quality of life. [x] Progressing  [] Met  [] Not Met     GAIT: Patient will demonstrate normalized gait mechanics with minimal compensation in order to return to prior level of function. [x] Progressing  [] Met  [] Not Met     Patient will return to normal ADL's, IADL's, community involvement, recreational activities, and work-related activities with less than or equal to 4/10 pain and maximal function.  [x] Progressing  [] Met  [] Not Met          PLAN     Continue Plan of Care (POC) and progress per patient tolerance. See treatment section for details on planned progressions next session.    Plan of Care Due: 07/11/2023    Mireya Moreno, PT

## 2023-05-09 ENCOUNTER — PATIENT MESSAGE (OUTPATIENT)
Dept: RESEARCH | Facility: HOSPITAL | Age: 63
End: 2023-05-09
Payer: COMMERCIAL

## 2023-05-15 ENCOUNTER — CLINICAL SUPPORT (OUTPATIENT)
Dept: REHABILITATION | Facility: HOSPITAL | Age: 63
End: 2023-05-15
Payer: COMMERCIAL

## 2023-05-15 DIAGNOSIS — M25.60 DECREASED RANGE OF MOTION: Primary | ICD-10-CM

## 2023-05-15 DIAGNOSIS — Z74.09 DECREASED FUNCTIONAL MOBILITY AND ENDURANCE: ICD-10-CM

## 2023-05-15 DIAGNOSIS — R53.1 DECREASED STRENGTH: ICD-10-CM

## 2023-05-15 PROCEDURE — 97530 THERAPEUTIC ACTIVITIES: CPT

## 2023-05-15 PROCEDURE — 97110 THERAPEUTIC EXERCISES: CPT

## 2023-05-15 PROCEDURE — 97112 NEUROMUSCULAR REEDUCATION: CPT

## 2023-05-15 NOTE — PROGRESS NOTES
[facility]   Physical Therapy Treatment Note     Name: Jenni Sotomayor [facility] Number: [id number]    Therapy Diagnosis:   Encounter Diagnoses   Name Primary?    Decreased range of motion Yes    Decreased strength     Decreased functional mobility and endurance      Physician: Rolando Rea NP    Visit Date: 5/15/2023    Physician Orders: Physical Therapy Evaluation and Treat  Medical Diagnosis from Referral: M54.50 (ICD-10-CM) - Midline low back pain without sciatica, unspecified chronicity  Evaluation Date: 4/18/2023  Authorization Period Expiration: 12/31/2023  Plan of Care Expiration: 07/11/2023  Progress Note Due: 05/18/2023  Visit # / Visits authorized: 3/12  FOTO: 1/3      Precautions: Standard; Anxiety: High Blood Pressure with Increased Pain     PTA Visit #: 0/5     Time In: 7:02 AM  Time Out: 7:42 AM  Total Billable Time: 40 minutes    SUBJECTIVE     Patient reports: she still has some back pain with sitting too long and laying down to long. However, she states she is getting some confidence back for reaching out. Patient also states she has started been walking twice a day for about 20-30 minutes. Patient states she is definitely getting better but still not 100%.  She (N/A) compliant with home exercise program.  Response to previous treatment: good  Functional change: decreased low back pain with most ACTIVITIES OF DAILY LIVING, with transfers, and with upper extremity reaching, pain with sitting too long and lying down too long, gaining some confidence with reaching    Pain: 2-3/10  Location: Low Back/ Anterior Bilateral Groin in Belt Distribution     OBJECTIVE     Objective Measures updated at progress report unless specified.     Treatment     Jenni received the treatments listed below:      THERAPEUTIC EXERCISES to develop strength, endurance, ROM, flexibility, posture, and core stabilization for (9) minutes including:     Intervention Performed Today      Recumbent Bike  x  5 minutes Level 1   Lower Trunk Rotations  x  3 minutes                                                      Plan for Next Visit: progress as tolerated      THERAPEUTIC ACTIVITIES: to improve functional performance through dynamic activities, for (18)  minutes including:   x = performed today    Therapeutic Activities 5/15/2023    Standing Hip Abduction  x 3x10 yellow band   Standing Hip Extension  x 3x10 yellow band   Supine Ball Squeeze  3 minutes with 3 second holds   Supine Belt Squeeze  3 minutes with 3 second holds   Lumbar Ball Roll Outs  3 minutes 3 ways   Sit to Stands x 3x10   Shuttle Squat x 5 Bands, 3 minutes    BOLD = new this visit  Plan for Next Visit: progress as tolerated     NEUROMUSCULAR RE-EDUCATION activities to improve: Coordination, Kinesthetic, Sense, and Proprioception for (15) minutes. The following activities were included: x = exercises performed     Neuromuscular Re-Education 5/15/2023    Posterior Pelvic Tilts x 3 minutes with 3 second holds   Clamshells x 3x10 bilateral   Supine Glute Squeezes  X30 with 3 second holds   Bridges x 3x10   Pallof Press x 2x10 bilateral, 7.5lbs    BOLD= new this visit  Plan for Next Visit: progress as tolerated        Patient Education and Home Exercises     Home Exercises Provided and Patient Education Provided     Education provided:   PURPOSE: Patient educated on the impairments noted above and the effects of physical therapy intervention to improve overall condition and QOL.   EXERCISE: Patient was educated on all the above exercise prior/during/after for proper posture, positioning, and execution for safe performance with home exercise program.   STRENGTH: Patient educated on the importance of improved core and extremity strength in order to improve alignment of the spine and extremities with static positions and dynamic movement.   Patient educated that because her symptoms are so flared up to wait one week for pain to subside to  less than 10/10 and that if they do not, to cancel her next PHYSICAL THERAPY and go to the emergency room. PHYSICAL THERAPY verbalized understanding.     Written Home Exercises Provided: Patient instructed to cont prior HEP. Exercises were reviewed and Jenni was able to demonstrate them prior to the end of the session.  Jenni demonstrated good  understanding of the education provided. See EMR under Patient Instructions for exercises provided during therapy sessions    ASSESSMENT   Patient tolerated session very well and does not look painful with any transfers.  Added standing hip strengthening exercises as well las palloff press and patient with good tolerance and good core control and stability noted. No adverse effects noted.    Jenni Is progressing well towards her goals.   Patient prognosis is Good.     Patient will continue to benefit from skilled outpatient physical therapy to address the deficits listed in the problem list box on initial evaluation, provide patient/family education and to maximize patient's level of independence in the home and community environment.     Patient's spiritual, cultural and educational needs considered and patient agreeable to plan of care and goals.     Anticipated barriers to physical therapy: co-morbidities, sedentary lifestyle, lack of understanding of condition, adherence to treatment plan, insurance coverage, and transportation    GOALS:  Reviewed: 05/15/2023     Short Term Goals:  6 weeks Status  Date Met   PAIN: Patient will report worst pain of 6/10 in order to progress toward max functional ability and improve quality of life. [x] Progressing  [] Met  [] Not Met     FUNCTION: Patient will demonstrate improved function as indicated by a functional limitation score of less than or equal to 55 out of 100 on FOTO. [x] Progressing  [] Met  [] Not Met     STRENGTH: Patient will improve strength to 50% of stated goals, listed in objective measures above, in order to  progress towards independence with functional activities.  [x] Progressing  [] Met  [] Not Met     POSTURE: Patient will correct postural deviations in sitting and standing, to decrease pain and promote long term stability.  [x] Progressing  [] Met  [] Not Met     GAIT: Patient will demonstrate improved gait mechanics in order to improve functional mobility, improve quality of life, and decrease risk of further injury or fall.  [x] Progressing  [] Met  [] Not Met        Long Term Goals:  12 weeks Status Date Met   PAIN: Patient will report worst pain of 4/10 in order to progress toward max functional ability and improve quality of life [x] Progressing  [] Met  [] Not Met     FUNCTION: Patient will demonstrate improved function as indicated by a functional limitation score of less than or equal to 47 out of 100 on FOTO. [x] Progressing  [] Met  [] Not Met     STRENGTH: Patient will improve strength to stated goals, listed in objective measures above, in order to improve functional independence and quality of life. [x] Progressing  [] Met  [] Not Met     GAIT: Patient will demonstrate normalized gait mechanics with minimal compensation in order to return to prior level of function. [x] Progressing  [] Met  [] Not Met     Patient will return to normal ADL's, IADL's, community involvement, recreational activities, and work-related activities with less than or equal to 4/10 pain and maximal function.  [x] Progressing  [] Met  [] Not Met          PLAN     Continue Plan of Care (POC) and progress per patient tolerance. See treatment section for details on planned progressions next session.    Plan of Care Due: 07/11/2023    Mireya Moreno, PT

## 2023-05-22 ENCOUNTER — CLINICAL SUPPORT (OUTPATIENT)
Dept: REHABILITATION | Facility: HOSPITAL | Age: 63
End: 2023-05-22
Payer: COMMERCIAL

## 2023-05-22 DIAGNOSIS — M25.60 DECREASED RANGE OF MOTION: Primary | ICD-10-CM

## 2023-05-22 DIAGNOSIS — Z74.09 DECREASED FUNCTIONAL MOBILITY AND ENDURANCE: ICD-10-CM

## 2023-05-22 DIAGNOSIS — R53.1 DECREASED STRENGTH: ICD-10-CM

## 2023-05-22 PROCEDURE — 97110 THERAPEUTIC EXERCISES: CPT

## 2023-05-22 PROCEDURE — 97112 NEUROMUSCULAR REEDUCATION: CPT

## 2023-05-22 PROCEDURE — 97530 THERAPEUTIC ACTIVITIES: CPT

## 2023-05-22 NOTE — PLAN OF CARE
"[facility]   Physical Therapy Treatment Note + Progress Report    Name: Jenni Sotomayor [facility] Number: [id number]    Therapy Diagnosis:   Encounter Diagnoses   Name Primary?    Decreased range of motion Yes    Decreased strength     Decreased functional mobility and endurance      Physician: Rolando Rea NP    Visit Date: 5/22/2023    Physician Orders: Physical Therapy Evaluation and Treat  Medical Diagnosis from Referral: M54.50 (ICD-10-CM) - Midline low back pain without sciatica, unspecified chronicity  Evaluation Date: 4/18/2023  Authorization Period Expiration: 12/31/2023  Plan of Care Expiration: 07/11/2023  Progress Note Due: 06/21/2023  Visit # / Visits authorized: 4/12  FOTO: 2/3      Precautions: Standard; Anxiety: High Blood Pressure with Increased Pain     PTA Visit #: 0/5     Time In: 7:02 AM  Time Out: 7:42 AM  Total Billable Time: 40 minutes    SUBJECTIVE     Patient reports: she thinks she may have "overdid it" this weekend as she help set up a tea party for her family and did a lot of furniture re-adjusting. Patient states her low back is a little sore from this. However, patient still reports she has noticed an improvement since starting PHYSICAL THERAPY.  She (N/A) compliant with home exercise program.  Response to previous treatment: good  Functional change: decreased low back pain with most ACTIVITIES OF DAILY LIVING, with transfers, and with upper extremity reaching, pain with sitting too long and lying down too long, gaining some confidence with reaching    Pain: 2-3/10  Location: Low Back/ Anterior Bilateral Groin in Belt Distribution     OBJECTIVE     Objective Measures updated at progress report unless specified.     *denotes change since initial evaluation     Posture: Patient presents with postural abnormalities which include:               [] Forward Head                                [] Increased Lumbar Lordosis              [] Rounded Shoulder "                        [] Genu Recurvatum              [] Increased Thoracic Kyphosis        [] Genu Valgus              [] Trunk Deviated                              [] Pes Planus              [] Scapular Winging                          [] Other: WITHIN FUNCTIONAL LIMITS*     Gait Analysis: The patient ambulated with the following assistive device: WITHIN FUNCTIONAL LIMITS*        Functional Movement  Analysis   Sit  to stand Non-Painful and functional*      STRENGTH:     L/E MMT Right   Left Pain/Dysfunction with Movement Goal   Hip Flexion  4+/5* 4+/5*  4+/5 Bilateral   Knee Extension 4/5* 4/5*  5/5 Bilateral   Knee Flexion 4-/5* 4/5*  5/5 Bilateral   Ankle Dorsiflexion 5/5* 5/5*  5/5 Bilateral   Ankle Plantarflexion 5/5* 5/5*  5/5 Bilateral          FUNCTION:      CMS Impairment/Limitation/Restriction for FOTO Lumbar NOC Survey     Therapist reviewed FOTO scores for Jenni on 4/18/2023.   FOTO documents entered into [employer] - see Media section.     Limitation Score: 35%*           Treatment     Jenni received the treatments listed below:      THERAPEUTIC EXERCISES to develop strength, endurance, ROM, flexibility, posture, and core stabilization for (11) minutes including:     Intervention Performed Today     Recumbent Bike  x  3 minutes Level 2   Lower Trunk Rotations  x  3 minutes   Objectives Re-assessed  x  5 minutes                                              Plan for Next Visit: progress as tolerated      THERAPEUTIC ACTIVITIES: to improve functional performance through dynamic activities, for (16)  minutes including:   x = performed today    Therapeutic Activities 5/22/2023    Standing Hip Abduction  x 3x10 yellow band   Standing Hip Extension  x 3x10 yellow band   Supine Ball Squeeze  3 minutes with 3 second holds   Supine Belt Squeeze  3 minutes with 3 second holds   Lumbar Ball Roll Outs  3 minutes 3 ways   Sit to Stands x 3x10   Shuttle Squat  5 Bands, 3 minutes    BOLD = new this visit  Plan for  Next Visit: progress as tolerated     NEUROMUSCULAR RE-EDUCATION activities to improve: Coordination, Kinesthetic, Sense, and Proprioception for (15) minutes. The following activities were included: x = exercises performed     Neuromuscular Re-Education 5/22/2023    Posterior Pelvic Tilts x 3 minutes with 3 second holds   Clamshells x 3x10 bilateral   Supine Glute Squeezes  X30 with 3 second holds   Bridges x 3x10   Pallof Press x 2x10 bilateral, 7.5lbs    BOLD= new this visit  Plan for Next Visit: progress as tolerated        Patient Education and Home Exercises     Home Exercises Provided and Patient Education Provided     Education provided:   PURPOSE: Patient educated on the impairments noted above and the effects of physical therapy intervention to improve overall condition and QOL.   EXERCISE: Patient was educated on all the above exercise prior/during/after for proper posture, positioning, and execution for safe performance with home exercise program.   STRENGTH: Patient educated on the importance of improved core and extremity strength in order to improve alignment of the spine and extremities with static positions and dynamic movement.   Patient educated that because her symptoms are so flared up to wait one week for pain to subside to less than 10/10 and that if they do not, to cancel her next PHYSICAL THERAPY and go to the emergency room. PHYSICAL THERAPY verbalized understanding.     Written Home Exercises Provided: Patient instructed to cont prior HEP. Exercises were reviewed and Jenni was able to demonstrate them prior to the end of the session.  Jenni demonstrated good  understanding of the education provided. See EMR under Patient Instructions for exercises provided during therapy sessions    ASSESSMENT   Since beginning PHYSICAL THERAPY patient has demonstrated significant improvements in many objective areas. More specifically, patient demonstrates better posture and gait and is no longer  forward flexed with pain. Patient demonstrates moderate to significant improvements in bilateral hip flexion, bilateral knee flexion/extension strength, and bilateral ankle DORSIFLEXION/PLANTAR FLEXION strength. Patient is also now able to perform 30 reps of sit to stands with good form instead of one at initial evaluation. Patient also with significant improvements in functional limitations per FOTO scores. Patient still presents with some pain during extracurricular and functional activities and still presents with some weakness and because of this patient would continue to benefit from skilled therapy to address these remaining deficits and improve her quality of life and return to prior level of function.    Jenni Is progressing well towards her goals.   Patient prognosis is Good.     Patient will continue to benefit from skilled outpatient physical therapy to address the deficits listed in the problem list box on initial evaluation, provide patient/family education and to maximize patient's level of independence in the home and community environment.     Patient's spiritual, cultural and educational needs considered and patient agreeable to plan of care and goals.     Anticipated barriers to physical therapy: co-morbidities, sedentary lifestyle, lack of understanding of condition, adherence to treatment plan, insurance coverage, and transportation    GOALS:  Reviewed: 05/22/2023     Short Term Goals:  6 weeks Status  Date Met   PAIN: Patient will report worst pain of 6/10 in order to progress toward max functional ability and improve quality of life. [] Progressing  [x] Met  [] Not Met  05/22/2023   FUNCTION: Patient will demonstrate improved function as indicated by a functional limitation score of less than or equal to 55 out of 100 on FOTO. [] Progressing  [x] Met  [] Not Met  05/22/2023   STRENGTH: Patient will improve strength to 50% of stated goals, listed in objective measures above, in order to  progress towards independence with functional activities.  [] Progressing  [x] Met  [] Not Met  05/22/2023   POSTURE: Patient will correct postural deviations in sitting and standing, to decrease pain and promote long term stability.  [] Progressing  [x] Met  [] Not Met  05/22/2023   GAIT: Patient will demonstrate improved gait mechanics in order to improve functional mobility, improve quality of life, and decrease risk of further injury or fall.  [] Progressing  [x] Met  [] Not Met  05/22/2023      Long Term Goals:  12 weeks Status Date Met   PAIN: Patient will report worst pain of 4/10 in order to progress toward max functional ability and improve quality of life [] Progressing  [x] Met  [] Not Met 05/22/2023    FUNCTION: Patient will demonstrate improved function as indicated by a functional limitation score of less than or equal to 47 out of 100 on FOTO. [] Progressing  [x] Met  [] Not Met 05/22/2023    STRENGTH: Patient will improve strength to stated goals, listed in objective measures above, in order to improve functional independence and quality of life. [x] Progressing  [] Met  [] Not Met     GAIT: Patient will demonstrate normalized gait mechanics with minimal compensation in order to return to prior level of function. [] Progressing  [x] Met  [] Not Met 05/22/2023    Patient will return to normal ADL's, IADL's, community involvement, recreational activities, and work-related activities with less than or equal to 4/10 pain and maximal function.  [x] Progressing  [] Met  [] Not Met          PLAN     Continue Plan of Care (POC) and progress per patient tolerance. See treatment section for details on planned progressions next session.    Plan of Care Due: 07/11/2023    Mireya Moreno, PT

## 2023-05-22 NOTE — PROGRESS NOTES
"[facility]   Physical Therapy Treatment Note + Progress Report    Name: Jenni Sotomayor [facility] Number: [id number]    Therapy Diagnosis:   Encounter Diagnoses   Name Primary?    Decreased range of motion Yes    Decreased strength     Decreased functional mobility and endurance      Physician: Rolando Rea NP    Visit Date: 5/22/2023    Physician Orders: Physical Therapy Evaluation and Treat  Medical Diagnosis from Referral: M54.50 (ICD-10-CM) - Midline low back pain without sciatica, unspecified chronicity  Evaluation Date: 4/18/2023  Authorization Period Expiration: 12/31/2023  Plan of Care Expiration: 07/11/2023  Progress Note Due: 06/21/2023  Visit # / Visits authorized: 4/12  FOTO: 2/3      Precautions: Standard; Anxiety: High Blood Pressure with Increased Pain     PTA Visit #: 0/5     Time In: 7:02 AM  Time Out: 7:42 AM  Total Billable Time: 40 minutes    SUBJECTIVE     Patient reports: she thinks she may have "overdid it" this weekend as she help set up a tea party for her family and did a lot of furniture re-adjusting. Patient states her low back is a little sore from this. However, patient still reports she has noticed an improvement since starting PHYSICAL THERAPY.  She (N/A) compliant with home exercise program.  Response to previous treatment: good  Functional change: decreased low back pain with most ACTIVITIES OF DAILY LIVING, with transfers, and with upper extremity reaching, pain with sitting too long and lying down too long, gaining some confidence with reaching    Pain: 2-3/10  Location: Low Back/ Anterior Bilateral Groin in Belt Distribution     OBJECTIVE     Objective Measures updated at progress report unless specified.     *denotes change since initial evaluation     Posture: Patient presents with postural abnormalities which include:               [] Forward Head                                [] Increased Lumbar Lordosis              [] Rounded Shoulder "                        [] Genu Recurvatum              [] Increased Thoracic Kyphosis        [] Genu Valgus              [] Trunk Deviated                              [] Pes Planus              [] Scapular Winging                          [] Other: WITHIN FUNCTIONAL LIMITS*     Gait Analysis: The patient ambulated with the following assistive device: WITHIN FUNCTIONAL LIMITS*        Functional Movement  Analysis   Sit  to stand Non-Painful and functional*      STRENGTH:     L/E MMT Right   Left Pain/Dysfunction with Movement Goal   Hip Flexion  4+/5* 4+/5*  4+/5 Bilateral   Knee Extension 4/5* 4/5*  5/5 Bilateral   Knee Flexion 4-/5* 4/5*  5/5 Bilateral   Ankle Dorsiflexion 5/5* 5/5*  5/5 Bilateral   Ankle Plantarflexion 5/5* 5/5*  5/5 Bilateral          FUNCTION:      CMS Impairment/Limitation/Restriction for FOTO Lumbar NOC Survey     Therapist reviewed FOTO scores for Jenni on 4/18/2023.   FOTO documents entered into [employer] - see Media section.     Limitation Score: 35%*           Treatment     Jenni received the treatments listed below:      THERAPEUTIC EXERCISES to develop strength, endurance, ROM, flexibility, posture, and core stabilization for (11) minutes including:     Intervention Performed Today     Recumbent Bike  x  3 minutes Level 2   Lower Trunk Rotations  x  3 minutes   Objectives Re-assessed  x  5 minutes                                              Plan for Next Visit: progress as tolerated      THERAPEUTIC ACTIVITIES: to improve functional performance through dynamic activities, for (16)  minutes including:   x = performed today    Therapeutic Activities 5/22/2023    Standing Hip Abduction  x 3x10 yellow band   Standing Hip Extension  x 3x10 yellow band   Supine Ball Squeeze  3 minutes with 3 second holds   Supine Belt Squeeze  3 minutes with 3 second holds   Lumbar Ball Roll Outs  3 minutes 3 ways   Sit to Stands x 3x10   Shuttle Squat  5 Bands, 3 minutes    BOLD = new this visit  Plan for  Next Visit: progress as tolerated     NEUROMUSCULAR RE-EDUCATION activities to improve: Coordination, Kinesthetic, Sense, and Proprioception for (15) minutes. The following activities were included: x = exercises performed     Neuromuscular Re-Education 5/22/2023    Posterior Pelvic Tilts x 3 minutes with 3 second holds   Clamshells x 3x10 bilateral   Supine Glute Squeezes  X30 with 3 second holds   Bridges x 3x10   Pallof Press x 2x10 bilateral, 7.5lbs    BOLD= new this visit  Plan for Next Visit: progress as tolerated        Patient Education and Home Exercises     Home Exercises Provided and Patient Education Provided     Education provided:   PURPOSE: Patient educated on the impairments noted above and the effects of physical therapy intervention to improve overall condition and QOL.   EXERCISE: Patient was educated on all the above exercise prior/during/after for proper posture, positioning, and execution for safe performance with home exercise program.   STRENGTH: Patient educated on the importance of improved core and extremity strength in order to improve alignment of the spine and extremities with static positions and dynamic movement.   Patient educated that because her symptoms are so flared up to wait one week for pain to subside to less than 10/10 and that if they do not, to cancel her next PHYSICAL THERAPY and go to the emergency room. PHYSICAL THERAPY verbalized understanding.     Written Home Exercises Provided: Patient instructed to cont prior HEP. Exercises were reviewed and Jenni was able to demonstrate them prior to the end of the session.  Jenni demonstrated good  understanding of the education provided. See EMR under Patient Instructions for exercises provided during therapy sessions    ASSESSMENT   Since beginning PHYSICAL THERAPY patient has demonstrated significant improvements in many objective areas. More specifically, patient demonstrates better posture and gait and is no longer  forward flexed with pain. Patient demonstrates moderate to significant improvements in bilateral hip flexion, bilateral knee flexion/extension strength, and bilateral ankle DORSIFLEXION/PLANTAR FLEXION strength. Patient is also now able to perform 30 reps of sit to stands with good form instead of one at initial evaluation. Patient also with significant improvements in functional limitations per FOTO scores. Patient still presents with some pain during extracurricular and functional activities and still presents with some weakness and because of this patient would continue to benefit from skilled therapy to address these remaining deficits and improve her quality of life and return to prior level of function.    Jenni Is progressing well towards her goals.   Patient prognosis is Good.     Patient will continue to benefit from skilled outpatient physical therapy to address the deficits listed in the problem list box on initial evaluation, provide patient/family education and to maximize patient's level of independence in the home and community environment.     Patient's spiritual, cultural and educational needs considered and patient agreeable to plan of care and goals.     Anticipated barriers to physical therapy: co-morbidities, sedentary lifestyle, lack of understanding of condition, adherence to treatment plan, insurance coverage, and transportation    GOALS:  Reviewed: 05/22/2023     Short Term Goals:  6 weeks Status  Date Met   PAIN: Patient will report worst pain of 6/10 in order to progress toward max functional ability and improve quality of life. [] Progressing  [x] Met  [] Not Met  05/22/2023   FUNCTION: Patient will demonstrate improved function as indicated by a functional limitation score of less than or equal to 55 out of 100 on FOTO. [] Progressing  [x] Met  [] Not Met  05/22/2023   STRENGTH: Patient will improve strength to 50% of stated goals, listed in objective measures above, in order to  progress towards independence with functional activities.  [] Progressing  [x] Met  [] Not Met  05/22/2023   POSTURE: Patient will correct postural deviations in sitting and standing, to decrease pain and promote long term stability.  [] Progressing  [x] Met  [] Not Met  05/22/2023   GAIT: Patient will demonstrate improved gait mechanics in order to improve functional mobility, improve quality of life, and decrease risk of further injury or fall.  [] Progressing  [x] Met  [] Not Met  05/22/2023      Long Term Goals:  12 weeks Status Date Met   PAIN: Patient will report worst pain of 4/10 in order to progress toward max functional ability and improve quality of life [] Progressing  [x] Met  [] Not Met 05/22/2023    FUNCTION: Patient will demonstrate improved function as indicated by a functional limitation score of less than or equal to 47 out of 100 on FOTO. [] Progressing  [x] Met  [] Not Met 05/22/2023    STRENGTH: Patient will improve strength to stated goals, listed in objective measures above, in order to improve functional independence and quality of life. [x] Progressing  [] Met  [] Not Met     GAIT: Patient will demonstrate normalized gait mechanics with minimal compensation in order to return to prior level of function. [] Progressing  [x] Met  [] Not Met 05/22/2023    Patient will return to normal ADL's, IADL's, community involvement, recreational activities, and work-related activities with less than or equal to 4/10 pain and maximal function.  [x] Progressing  [] Met  [] Not Met          PLAN     Continue Plan of Care (POC) and progress per patient tolerance. See treatment section for details on planned progressions next session.    Plan of Care Due: 07/11/2023    Mireya Moreno, PT

## 2023-06-05 ENCOUNTER — CLINICAL SUPPORT (OUTPATIENT)
Dept: REHABILITATION | Facility: HOSPITAL | Age: 63
End: 2023-06-05
Payer: COMMERCIAL

## 2023-06-05 DIAGNOSIS — R53.1 DECREASED STRENGTH: ICD-10-CM

## 2023-06-05 DIAGNOSIS — Z74.09 DECREASED FUNCTIONAL MOBILITY AND ENDURANCE: ICD-10-CM

## 2023-06-05 DIAGNOSIS — M25.60 DECREASED RANGE OF MOTION: Primary | ICD-10-CM

## 2023-06-05 PROCEDURE — 97112 NEUROMUSCULAR REEDUCATION: CPT

## 2023-06-05 PROCEDURE — 97110 THERAPEUTIC EXERCISES: CPT

## 2023-06-05 PROCEDURE — 97530 THERAPEUTIC ACTIVITIES: CPT

## 2023-06-05 NOTE — PROGRESS NOTES
[facility]   Physical Therapy Treatment Note     Name: Jenni Sotomayor [facility] Number: [id number]    Therapy Diagnosis:   Encounter Diagnoses   Name Primary?    Decreased range of motion Yes    Decreased strength     Decreased functional mobility and endurance      Physician: Rolando Rea NP    Visit Date: 6/5/2023    Physician Orders: Physical Therapy Evaluation and Treat  Medical Diagnosis from Referral: M54.50 (ICD-10-CM) - Midline low back pain without sciatica, unspecified chronicity  Evaluation Date: 4/18/2023  Authorization Period Expiration: 12/31/2023  Plan of Care Expiration: 07/11/2023  Progress Note Due: 06/21/2023  Visit # / Visits authorized: 5/12  FOTO: 3/3      Precautions: Standard; Anxiety: High Blood Pressure with Increased Pain     PTA Visit #: 0/5     Time In: 12:30 PM  Time Out: 1:10 PM  Total Billable Time: 40 minutes    SUBJECTIVE     Patient reports: she may be starting a new job and may or may not return to therapy. Patient states she is feeling better, but would like a HOME EXERCISE PROGRAM just in case she does not return.  She (N/A) compliant with home exercise program.  Response to previous treatment: good  Functional change: decreased low back pain with most ACTIVITIES OF DAILY LIVING, with transfers, and with upper extremity reaching, pain with sitting too long and lying down too long, gaining some confidence with reaching    Pain: 2-3/10  Location: Low Back/ Anterior Bilateral Groin in Belt Distribution     OBJECTIVE     Objective Measures updated at progress report unless specified.     FUNCTION:      CMS Impairment/Limitation/Restriction for FOTO Lumbar NOC Survey     Therapist reviewed FOTO scores for Jenni on 4/18/2023.   FOTO documents entered into [employer] - see Media section.     Limitation Score: 35%*           Treatment     Jenni received the treatments listed below:      THERAPEUTIC EXERCISES to develop strength, endurance, ROM,  flexibility, posture, and core stabilization for (11) minutes including:     Intervention Performed Today     Recumbent Bike  x  5 minutes Level 2   Lower Trunk Rotations  x  3 minutes   Objectives Re-assessed x   3 minutes, FOTO                                              Plan for Next Visit: progress as tolerated      THERAPEUTIC ACTIVITIES: to improve functional performance through dynamic activities, for (15)  minutes including:   x = performed today    Therapeutic Activities 6/5/2023    Standing Hip Abduction  x 3x10 red band   Standing Hip Extension  x 3x10 red band   Supine Ball Squeeze  3 minutes with 3 second holds   Supine Belt Squeeze  3 minutes with 3 second holds   Lumbar Ball Roll Outs  3 minutes 3 ways   Sit to Stands x 3x10   Shuttle Squat  5 Bands, 3 minutes    BOLD = new this visit  Plan for Next Visit: progress as tolerated     NEUROMUSCULAR RE-EDUCATION activities to improve: Coordination, Kinesthetic, Sense, and Proprioception for (14) minutes. The following activities were included: x = exercises performed     Neuromuscular Re-Education 6/5/2023    Posterior Pelvic Tilts x 3 minutes with 3 second holds   Supine Dead Bugs x 4x5 bilateral LOWER EXTREMITY only    HOME EXERCISE PROGRAM review and education x 5 minutes. See EMR for details   Clamshells  3x10 bilateral   Supine Glute Squeezes  X30 with 3 second holds   Bridges  3x10   Pallof Press  2x10 bilateral, 7.5lbs    BOLD= new this visit  Plan for Next Visit: progress as tolerated        Patient Education and Home Exercises     Home Exercises Provided and Patient Education Provided     Education provided:   PURPOSE: Patient educated on the impairments noted above and the effects of physical therapy intervention to improve overall condition and QOL.   EXERCISE: Patient was educated on all the above exercise prior/during/after for proper posture, positioning, and execution for safe performance with home exercise program.   STRENGTH: Patient  educated on the importance of improved core and extremity strength in order to improve alignment of the spine and extremities with static positions and dynamic movement.   Patient educated that because her symptoms are so flared up to wait one week for pain to subside to less than 10/10 and that if they do not, to cancel her next PHYSICAL THERAPY and go to the emergency room. PHYSICAL THERAPY verbalized understanding.     Written Home Exercises Provided: Patient instructed to cont prior HEP. Exercises were reviewed and Jenni was able to demonstrate them prior to the end of the session.  Jenni demonstrated good  understanding of the education provided. See EMR under Patient Instructions for exercises provided during therapy sessions    ASSESSMENT   Patient tolerated today's session with no reported increase in pain. HOME EXERCISE PROGRAM given (see EMR for details) and reviewed with patient and patient verbalized and demonstrated understanding. Plan to continue progressing as tolerated if feasible. No adverse effects noted.    Jenni Is progressing well towards her goals.   Patient prognosis is Good.     Patient will continue to benefit from skilled outpatient physical therapy to address the deficits listed in the problem list box on initial evaluation, provide patient/family education and to maximize patient's level of independence in the home and community environment.     Patient's spiritual, cultural and educational needs considered and patient agreeable to plan of care and goals.     Anticipated barriers to physical therapy: co-morbidities, sedentary lifestyle, lack of understanding of condition, adherence to treatment plan, insurance coverage, and transportation    GOALS:  Reviewed: 06/05/2023     Short Term Goals:  6 weeks Status  Date Met   PAIN: Patient will report worst pain of 6/10 in order to progress toward max functional ability and improve quality of life. [] Progressing  [x] Met  [] Not Met   05/22/2023   FUNCTION: Patient will demonstrate improved function as indicated by a functional limitation score of less than or equal to 55 out of 100 on FOTO. [] Progressing  [x] Met  [] Not Met  05/22/2023   STRENGTH: Patient will improve strength to 50% of stated goals, listed in objective measures above, in order to progress towards independence with functional activities.  [] Progressing  [x] Met  [] Not Met  05/22/2023   POSTURE: Patient will correct postural deviations in sitting and standing, to decrease pain and promote long term stability.  [] Progressing  [x] Met  [] Not Met  05/22/2023   GAIT: Patient will demonstrate improved gait mechanics in order to improve functional mobility, improve quality of life, and decrease risk of further injury or fall.  [] Progressing  [x] Met  [] Not Met  05/22/2023      Long Term Goals:  12 weeks Status Date Met   PAIN: Patient will report worst pain of 4/10 in order to progress toward max functional ability and improve quality of life [] Progressing  [x] Met  [] Not Met 05/22/2023    FUNCTION: Patient will demonstrate improved function as indicated by a functional limitation score of less than or equal to 47 out of 100 on FOTO. [] Progressing  [x] Met  [] Not Met 05/22/2023    STRENGTH: Patient will improve strength to stated goals, listed in objective measures above, in order to improve functional independence and quality of life. [x] Progressing  [] Met  [] Not Met     GAIT: Patient will demonstrate normalized gait mechanics with minimal compensation in order to return to prior level of function. [] Progressing  [x] Met  [] Not Met 05/22/2023    Patient will return to normal ADL's, IADL's, community involvement, recreational activities, and work-related activities with less than or equal to 4/10 pain and maximal function.  [x] Progressing  [] Met  [] Not Met          PLAN     Continue Plan of Care (POC) and progress per patient tolerance. See treatment section for  details on planned progressions next session.    Plan of Care Due: 07/11/2023    Mireya Moreno PT

## 2023-06-16 ENCOUNTER — DOCUMENTATION ONLY (OUTPATIENT)
Dept: REHABILITATION | Facility: HOSPITAL | Age: 63
End: 2023-06-16
Payer: COMMERCIAL

## 2023-06-16 PROBLEM — Z74.09 DECREASED FUNCTIONAL MOBILITY AND ENDURANCE: Status: RESOLVED | Noted: 2023-04-18 | Resolved: 2023-06-16

## 2023-06-16 PROBLEM — R53.1 DECREASED STRENGTH: Status: RESOLVED | Noted: 2023-04-18 | Resolved: 2023-06-16

## 2023-06-16 PROBLEM — M25.60 DECREASED RANGE OF MOTION: Status: RESOLVED | Noted: 2023-04-18 | Resolved: 2023-06-16

## 2023-06-16 NOTE — PROGRESS NOTES
[facility]  Physical Therapy Discharge Note    Name: Jenni Sotomayor [facility] Number: [id number]    Therapy Diagnosis: No diagnosis found.  Physician: No ref. provider found    Physician Orders: Physical Therapy Evaluation and Treat  Medical Diagnosis from Referral: M54.50 (ICD-10-CM) - Midline low back pain without sciatica, unspecified chronicity  Evaluation Date: 4/18/2023      Date of Last visit: 06/05/2023  Total Visits Received: 5    ASSESSMENT      See last treatment note.    Discharge reason: Patient requested discharge    Discharge FOTO Score: 35%    Goals: See last treatment note.    PLAN   This patient is discharged from PT.    Mireya Moreno, PT, DPT

## 2024-02-15 ENCOUNTER — TELEPHONE (OUTPATIENT)
Dept: FAMILY MEDICINE | Facility: CLINIC | Age: 64
End: 2024-02-15
Payer: COMMERCIAL

## 2024-03-27 DIAGNOSIS — Z12.31 OTHER SCREENING MAMMOGRAM: ICD-10-CM

## 2024-04-24 NOTE — PROGRESS NOTES
Subjective:   Patient ID: Jenni Rush is a 62 y.o. female.  Chief Complaint:  Lumbar Spine Pain (L-Spine)    PCP Dr. Patel   Patient presents for evaluation of acute back pain   Overall poor historian but current pain appears to be both lower thoracic and lumbar sacral   Currently on Lidoderm patches, Lyrica, and muscle relaxant for next pain from a worker's compensation injury   States 5 days ago this back pain started in his not relieved with those medications   Last documented back pain in the chart 2018   No imaging on file  Denies trauma or any specific trigger for this episode  Does describe some bilateral radiation to the buttocks and possibly down her right leg consistent with sciatica   Also reports mild gait disturbance with some increased falls, but this seems to predates current episode of pain    Back Pain  This is a new problem. The current episode started in the past 7 days. The problem occurs constantly. The problem is unchanged. The pain is present in the thoracic spine. Quality: sharp. Radiates to: right paraspinal muscles. The pain is at a severity of 9/10. The pain is severe. The pain is The same all the time. The symptoms are aggravated by twisting and position. Associated symptoms include numbness, tingling and weakness. Pertinent negatives include no abdominal pain, bladder incontinence, bowel incontinence, chest pain, dysuria, fever, headaches or leg pain. Treatments tried: Lidocaine patch, Lyrica, Zanaflex. The treatment provided no relief.     Review of Systems   Constitutional:  Negative for chills, fatigue and fever.   Respiratory:  Negative for chest tightness and shortness of breath.    Cardiovascular:  Negative for chest pain, palpitations and leg swelling.   Gastrointestinal:  Negative for abdominal pain, bowel incontinence, constipation, diarrhea, nausea and vomiting.   Genitourinary:  Negative for bladder incontinence, decreased urine volume, difficulty urinating,  "dysuria, flank pain, frequency, hematuria and urgency.   Musculoskeletal:  Positive for back pain, gait problem and myalgias. Negative for arthralgias, joint swelling, neck pain and neck stiffness.   Skin:  Negative for rash.   Neurological:  Positive for tingling, weakness and numbness. Negative for dizziness, tremors, syncope, light-headedness and headaches.   Psychiatric/Behavioral:  Negative for sleep disturbance. The patient is not nervous/anxious.      Objective:   /82 (BP Location: Left arm, Patient Position: Sitting, BP Method: Small (Manual))   Pulse 85   Temp 97.5 °F (36.4 °C) (Tympanic)   Ht 5' 2" (1.575 m)   Wt 69 kg (152 lb 1.9 oz)   SpO2 98%   BMI 27.82 kg/m²     Physical Exam  Vitals and nursing note reviewed.   Constitutional:       Appearance: Normal appearance. She is well-developed and normal weight.   Cardiovascular:      Rate and Rhythm: Normal rate and regular rhythm.   Pulmonary:      Effort: Pulmonary effort is normal.   Musculoskeletal:      Thoracic back: Bony tenderness (T10-12) present. No swelling, deformity or spasms. Normal range of motion.      Lumbar back: Bony tenderness present. No spasms. Decreased range of motion (L4-L5 S1).      Right lower leg: No edema.      Left lower leg: No edema.   Skin:     Findings: No rash.   Neurological:      Mental Status: She is alert.      Gait: Gait normal.   Psychiatric:         Mood and Affect: Mood and affect normal.     Assessment:       ICD-10-CM ICD-9-CM   1. Acute midline thoracic back pain  M54.6 724.1   2. Acute exacerbation of chronic low back pain  M54.50 724.2    G89.29 338.19     338.29     Plan:   Acute midline thoracic back pain  Acute exacerbation of chronic low back pain  -     ketorolac injection 60 mg  -     X-Ray Thoracic Spine AP Lateral; Future; Expected date: 03/20/2023  -     X-Ray Lumbar Spine Ap And Lateral; Future; Expected date: 03/20/2023  X-ray thoracic and lumbar spine to rule out acute fracture or other " abnormality as cause of increased pain   Toradol injection in office  Continue other medications as prescribed   Follow-up with Dr. Patel 2 weeks   Based on response to therapy, may need to start physical therapy    Ashanti López, MS4    I hereby acknowledge that I am relying upon documentation authored by a medical student working under my supervision and further I hereby attest that I have verified the student documentation or findings by personally re-performing the physical exam and medical decision making activities of the Evaluation and Management service to be billed.  Cristopher Ag    Addendum   Thoracic x-ray negative for any acute fracture or other significant chronic pathology   Lumbar sacral spine with chronic degenerative changes of L4, L5, S1 to include facet arthritis, spondylosis, also noted sacroiliac degenerative changes   Continue current medications   See if Toradol injection helps   Follow-up PCP as scheduled     Patient expressed no known problems or needs

## 2024-06-24 ENCOUNTER — OFFICE VISIT (OUTPATIENT)
Dept: FAMILY MEDICINE | Facility: CLINIC | Age: 64
End: 2024-06-24
Payer: COMMERCIAL

## 2024-06-24 VITALS
SYSTOLIC BLOOD PRESSURE: 162 MMHG | TEMPERATURE: 98 F | OXYGEN SATURATION: 98 % | WEIGHT: 150.56 LBS | HEART RATE: 90 BPM | HEIGHT: 62 IN | RESPIRATION RATE: 18 BRPM | DIASTOLIC BLOOD PRESSURE: 92 MMHG | BODY MASS INDEX: 27.7 KG/M2

## 2024-06-24 DIAGNOSIS — R03.0 ELEVATED BLOOD PRESSURE READING: Primary | ICD-10-CM

## 2024-06-24 DIAGNOSIS — E11.9 TYPE 2 DIABETES MELLITUS WITHOUT COMPLICATION, WITHOUT LONG-TERM CURRENT USE OF INSULIN: ICD-10-CM

## 2024-06-24 PROCEDURE — 99214 OFFICE O/P EST MOD 30 MIN: CPT | Mod: S$GLB,,, | Performed by: NURSE PRACTITIONER

## 2024-06-24 PROCEDURE — 3008F BODY MASS INDEX DOCD: CPT | Mod: CPTII,S$GLB,, | Performed by: NURSE PRACTITIONER

## 2024-06-24 PROCEDURE — 3077F SYST BP >= 140 MM HG: CPT | Mod: CPTII,S$GLB,, | Performed by: NURSE PRACTITIONER

## 2024-06-24 PROCEDURE — 3080F DIAST BP >= 90 MM HG: CPT | Mod: CPTII,S$GLB,, | Performed by: NURSE PRACTITIONER

## 2024-06-24 PROCEDURE — 99999 PR PBB SHADOW E&M-EST. PATIENT-LVL IV: CPT | Mod: PBBFAC,,, | Performed by: NURSE PRACTITIONER

## 2024-06-24 PROCEDURE — 3044F HG A1C LEVEL LT 7.0%: CPT | Mod: CPTII,S$GLB,, | Performed by: NURSE PRACTITIONER

## 2024-06-24 RX ORDER — AMLODIPINE BESYLATE 5 MG/1
5 TABLET ORAL DAILY
Qty: 30 TABLET | Refills: 3 | Status: SHIPPED | OUTPATIENT
Start: 2024-06-24 | End: 2025-06-24

## 2024-06-24 RX ORDER — TIRZEPATIDE 2.5 MG/.5ML
2.5 INJECTION, SOLUTION SUBCUTANEOUS
Qty: 4 PEN | Refills: 10 | Status: SHIPPED | OUTPATIENT
Start: 2024-06-24

## 2024-06-24 RX ORDER — METFORMIN HYDROCHLORIDE 500 MG/1
500 TABLET ORAL 2 TIMES DAILY WITH MEALS
Qty: 150 TABLET | Refills: 3 | Status: SHIPPED | OUTPATIENT
Start: 2024-06-24 | End: 2025-06-24

## 2024-06-25 ENCOUNTER — TELEPHONE (OUTPATIENT)
Dept: FAMILY MEDICINE | Facility: CLINIC | Age: 64
End: 2024-06-25
Payer: COMMERCIAL

## 2024-06-25 DIAGNOSIS — E11.9 TYPE 2 DIABETES MELLITUS WITHOUT COMPLICATION, WITHOUT LONG-TERM CURRENT USE OF INSULIN: Primary | ICD-10-CM

## 2024-06-25 NOTE — TELEPHONE ENCOUNTER
----- Message from Carla Garcia sent at 6/25/2024  8:03 AM CDT -----  Regarding: meter  Name of who is calling:   Jenni      What is the request in detail: Pt is requesting a call back in ref to ordering a glucose meter / [id number] - [place] - [address]    Phone: [phone]  Fax: [phone]            Can the clinic reply by [facility]:yes      What number to call back if not [facility]: [phone]

## 2024-06-26 RX ORDER — LANCETS
1 EACH MISCELLANEOUS 4 TIMES DAILY
Qty: 200 EACH | Refills: 2 | Status: SHIPPED | OUTPATIENT
Start: 2024-06-26

## 2024-07-01 DIAGNOSIS — E11.9 TYPE 2 DIABETES MELLITUS WITHOUT COMPLICATION, WITHOUT LONG-TERM CURRENT USE OF INSULIN: ICD-10-CM

## 2024-07-01 RX ORDER — TIRZEPATIDE 2.5 MG/.5ML
2.5 INJECTION, SOLUTION SUBCUTANEOUS
Qty: 4 PEN | Refills: 10 | OUTPATIENT
Start: 2024-07-01

## 2024-07-01 NOTE — TELEPHONE ENCOUNTER
----- Message from Cat Farnsworth LPN sent at 7/1/2024  1:26 PM CDT -----  Contact: Jenni    ----- Message -----  From: Margaret Segura  Sent: 7/1/2024   1:21 PM CDT  To: Jackie Novak Staff    Patient calling stating Pharmacy need approval for tirzepatide (MOUNJARO) 2.5 mg/0.5 mL PnIj. Please callback [phone] to assist.    Also         [id number] [place] - [address] [place], [place] [address]  [place] [address]  Phone: [phone] Fax: [phone]

## 2024-07-01 NOTE — TELEPHONE ENCOUNTER
No care due was identified.  [facility] Embedded Care Due Messages. Reference number: [id number].   7/01/2024 1:38:49 PM CDT

## 2024-07-08 ENCOUNTER — OFFICE VISIT (OUTPATIENT)
Dept: FAMILY MEDICINE | Facility: CLINIC | Age: 64
End: 2024-07-08
Payer: COMMERCIAL

## 2024-07-08 VITALS
HEART RATE: 90 BPM | BODY MASS INDEX: 26.94 KG/M2 | OXYGEN SATURATION: 96 % | TEMPERATURE: 96 F | SYSTOLIC BLOOD PRESSURE: 136 MMHG | WEIGHT: 146.38 LBS | HEIGHT: 62 IN | DIASTOLIC BLOOD PRESSURE: 80 MMHG

## 2024-07-08 DIAGNOSIS — I10 HYPERTENSION, UNSPECIFIED TYPE: ICD-10-CM

## 2024-07-08 DIAGNOSIS — E11.9 TYPE 2 DIABETES MELLITUS WITHOUT COMPLICATION, WITHOUT LONG-TERM CURRENT USE OF INSULIN: ICD-10-CM

## 2024-07-08 DIAGNOSIS — E78.5 HYPERLIPIDEMIA, UNSPECIFIED HYPERLIPIDEMIA TYPE: Primary | ICD-10-CM

## 2024-07-08 PROCEDURE — 99214 OFFICE O/P EST MOD 30 MIN: CPT | Mod: S$GLB,,, | Performed by: NURSE PRACTITIONER

## 2024-07-08 PROCEDURE — 1159F MED LIST DOCD IN RCRD: CPT | Mod: CPTII,S$GLB,, | Performed by: NURSE PRACTITIONER

## 2024-07-08 PROCEDURE — 3075F SYST BP GE 130 - 139MM HG: CPT | Mod: CPTII,S$GLB,, | Performed by: NURSE PRACTITIONER

## 2024-07-08 PROCEDURE — 3008F BODY MASS INDEX DOCD: CPT | Mod: CPTII,S$GLB,, | Performed by: NURSE PRACTITIONER

## 2024-07-08 PROCEDURE — 3044F HG A1C LEVEL LT 7.0%: CPT | Mod: CPTII,S$GLB,, | Performed by: NURSE PRACTITIONER

## 2024-07-08 PROCEDURE — 99999 PR PBB SHADOW E&M-EST. PATIENT-LVL IV: CPT | Mod: PBBFAC,,, | Performed by: NURSE PRACTITIONER

## 2024-07-08 PROCEDURE — 3079F DIAST BP 80-89 MM HG: CPT | Mod: CPTII,S$GLB,, | Performed by: NURSE PRACTITIONER

## 2024-07-08 PROCEDURE — 1160F RVW MEDS BY RX/DR IN RCRD: CPT | Mod: CPTII,S$GLB,, | Performed by: NURSE PRACTITIONER

## 2024-07-08 NOTE — PROGRESS NOTES
Jenni Rush  07/08/2024  [id number]    Theresa Patel MD  Patient Care Team:  Theresa Patel MD as PCP - General (Family Medicine)  Jimmy Robles LPN as Care Coordinator          Visit Type:a scheduled routine follow-up visit    Chief Complaint:  Chief Complaint   Patient presents with    Follow-up       History of Present Illness:    63 year old female presents for follow up for elevated bp.   Denies sinus pressure, nasal congestion, ear pain, fever, cough, chills, body aches, chest pain, nausea, vomiting, diarrhea, abdominal pain, weakness, shortness of breath, wheezing.   No other complaints at this time.     History:  Past Medical History:   Diagnosis Date    Elevated blood pressure reading without diagnosis of hypertension     History of abnormal cervical Pap smear     x 1 with repeat normal - thus follow routine pap screening    Hyperlipidemia 7/8/2024    Postmenopausal     Type 2 diabetes mellitus without complication, without long-term current use of insulin 6/24/2024     Past Surgical History:   Procedure Laterality Date    COLONOSCOPY N/A 6/12/2018    Procedure: COLONOSCOPY;  Surgeon: Carl Mora III, MD;  Location: [facility];  Service: Endoscopy;  Laterality: N/A;    None       Family History   Problem Relation Name Age of Onset    Hypertension Mother      Anxiety disorder Mother      Hyperlipidemia Mother      Heart attack Father      Coronary artery disease Brother      Stomach cancer Paternal Grandfather      No Known Problems Brother      Anxiety disorder Daughter      No Known Problems Son      Diabetes Paternal Aunt      Thyroid disease Neg Hx       Social History     Socioeconomic History    Marital status:     Number of children: 2   Occupational History    Occupation:    Tobacco Use    Smoking status: Never    Smokeless tobacco: Never   Substance and Sexual Activity    Alcohol use: Yes     Alcohol/week: 1.0 standard drink of alcohol     Types: 1 Glasses of  wine per week     Comment: rarely    Drug use: No    Sexual activity: Yes     Partners: Male     Birth control/protection: Post-menopausal   Social History Narrative    She wears seatbelt.     Social Determinants of Health     Financial Resource Strain: Low Risk  (2/5/2024)    [facility] [employer] [facility]    Overall Financial Resource Strain (CARDIA)     Difficulty of Paying Living Expenses: Not hard at all   Food Insecurity: No Food Insecurity (2/5/2024)    Received from [employer] [facility]    Hunger Vital Sign     Worried About Running Out of Food in the Last Year: Never true     Ran Out of Food in the Last Year: Never true   Transportation Needs: No Transportation Needs (2/5/2024)    [facility] [employer] [facility]    PRAPARE - Transportation     Lack of Transportation (Medical): No     Lack of Transportation (Non-Medical): No     Patient Active Problem List   Diagnosis    Chronic constipation    Postmenopausal    Anxiety disorder    Decreased hearing    Arthropathy of cervical facet joint    Prolapsed cervical intervertebral disc    Type 2 diabetes mellitus without complication, without long-term current use of insulin    Hyperlipidemia     Review of patient's allergies indicates:  No Known Allergies    The following were reviewed at this visit: active problem list, medication list, allergies, family history, social history, and health maintenance.    Medications:  Current Outpatient Medications on File Prior to Visit   Medication Sig Dispense Refill    amLODIPine (NORVASC) 5 MG tablet Take 1 tablet (5 mg total) by mouth once daily. 30 tablet 3    blood-glucose meter,continuous Misc 1 each by Misc.(Non-Drug; Combo Route) route 4 (four) times daily. 1 each 0    lancets (LANCETS,THIN) Misc 1 each by  Misc.(Non-Drug; Combo Route) route 4 (four) times daily. 200 each 2    LIDODERM 5 % 1 patch.      metFORMIN (GLUCOPHAGE) 500 MG tablet Take 1 tablet (500 mg total) by mouth 2 (two) times daily with meals. 150 tablet 3    pregabalin (LYRICA) 50 MG capsule Take 50 mg by mouth 3 (three) times daily.      tirzepatide (MOUNJARO) 2.5 mg/0.5 mL PnIj Inject 2.5 mg into the skin every 7 days. 4 Pen 10    tiZANidine (ZANAFLEX) 4 MG tablet Take 4 mg by mouth every evening.      valACYclovir (VALTREX) 1000 MG tablet Take 1,000 mg by mouth 2 (two) times daily.      [DISCONTINUED] blood sugar diagnostic Strp 1 each by Misc.(Non-Drug; Combo Route) route 4 (four) times daily. 100 each 3    DULoxetine (CYMBALTA) 30 MG capsule duloxetine 30 mg capsule,delayed release   TAKE 1 CAPSULE BY MOUTH EVERY DAY (Patient not taking: Reported on 6/24/2024)       No current facility-administered medications on file prior to visit.       Medications have been reviewed and reconciled with patient at this visit.  Barriers to medications reviewed with patient.    Adverse reactions to current medications reviewed with patient..    Over the counter medications reviewed and reconciled with patient.    Exam:  Wt Readings from Last 3 Encounters:   07/08/24 66.4 kg (146 lb 6.2 oz)   06/24/24 68.3 kg (150 lb 9.2 oz)   04/11/23 67.6 kg (149 lb 0.5 oz)     Temp Readings from Last 3 Encounters:   07/08/24 96 °F (35.6 °C) (Temporal)   06/24/24 97.9 °F (36.6 °C) (Tympanic)   04/11/23 96.4 °F (35.8 °C)     BP Readings from Last 3 Encounters:   07/08/24 136/80   06/24/24 (!) 162/92   04/11/23 124/72     Pulse Readings from Last 3 Encounters:   07/08/24 90   06/24/24 90   04/11/23 94     Body mass index is 26.77 kg/m².      Review of Systems   Constitutional:  Negative for fever.   Respiratory:  Negative for cough, shortness of breath and wheezing.    Cardiovascular:  Negative for chest pain and palpitations.   Gastrointestinal:  Negative for nausea.    Neurological:  Negative for speech change, weakness and headaches.   All other systems reviewed and are negative.    Physical Exam  Vitals and nursing note reviewed.   Constitutional:       Appearance: Normal appearance. She is obese.   HENT:      Head: Normocephalic and atraumatic.      Right Ear: Tympanic membrane, ear canal and external ear normal.      Left Ear: Tympanic membrane, ear canal and external ear normal.      Nose: Nose normal.      Mouth/Throat:      Mouth: Mucous membranes are moist.      Pharynx: Oropharynx is clear.   Eyes:      Extraocular Movements: Extraocular movements intact.      Conjunctiva/sclera: Conjunctivae normal.      Pupils: Pupils are equal, round, and reactive to light.   Cardiovascular:      Rate and Rhythm: Normal rate and regular rhythm.      Pulses: Normal pulses.      Heart sounds: Normal heart sounds.   Pulmonary:      Effort: Pulmonary effort is normal.      Breath sounds: Normal breath sounds.   Abdominal:      General: Bowel sounds are normal.      Palpations: Abdomen is soft.   Musculoskeletal:         General: Normal range of motion.      Cervical back: Normal range of motion and neck supple.   Skin:     General: Skin is warm and dry.      Capillary Refill: Capillary refill takes less than 2 seconds.   Neurological:      General: No focal deficit present.      Mental Status: She is alert and oriented to person, place, and time.   Psychiatric:         Mood and Affect: Mood normal.         Behavior: Behavior normal.         Thought Content: Thought content normal.         Judgment: Judgment normal.               Laboratory Reviewed ({Yes)  Lab Results   Component Value Date    WBC 4.13 03/08/2023    HGB 12.6 03/08/2023    HCT 41.5 03/08/2023     03/08/2023    CHOL 232 (H) 02/05/2024    TRIG 133 02/05/2024    HDL 62 02/05/2024    ALT 14 03/08/2023    AST 17 03/08/2023     02/05/2024    K 3.7 02/05/2024     02/05/2024    CREATININE 0.73 02/05/2024    BUN  14 02/05/2024    CO2 24 02/05/2024    TSH 1.824 02/04/2024    HGBA1C 6.7 (H) 02/05/2024       Jenni was seen today for follow-up.    Diagnoses and all orders for this visit:    Hyperlipidemia, unspecified hyperlipidemia type    Type 2 diabetes mellitus without complication, without long-term current use of insulin  -     blood sugar diagnostic Strp; 1 each by Misc.(Non-Drug; Combo Route) route 4 (four) times daily.    Hypertension, unspecified type          Plan   The current medical regimen is effective;  continue present plan and medications.   Start mounjaro if approved      Care Plan/Goals: Reviewed    Goals    None     Jenni was seen today for follow-up.    Diagnoses and all orders for this visit:    Hyperlipidemia, unspecified hyperlipidemia type    Type 2 diabetes mellitus without complication, without long-term current use of insulin  -     blood sugar diagnostic Strp; 1 each by Misc.(Non-Drug; Combo Route) route 4 (four) times daily.    Hypertension, unspecified type         Follow up: Follow up for with  for 6 month follow up.    After visit summary was printed and given to patient upon discharge today.  Patient goals and care plan are included in After Visit Summary.

## 2024-07-19 ENCOUNTER — PATIENT OUTREACH (OUTPATIENT)
Dept: ADMINISTRATIVE | Facility: HOSPITAL | Age: 64
End: 2024-07-19
Payer: COMMERCIAL

## 2024-07-19 NOTE — PROGRESS NOTES
VBHM Score: 4     Urine Screening  Eye Exam  Mammogram  Foot Exam    Pneumonia Vaccine  Shingles/Zoster Vaccine  RSV Vaccine               Additional Notes:  Attempted to contact the patient to discuss/schedule overdue HM screenings, no answer, left voicemail. Patient has next visit with PCP on 1/13/25.           Care Management, Digital Medicine, and/or Education Referrals    OPCM Risk Score: 37         Next Steps - Referral Actions: no referrals        Additional Notes:  SDOH reviewed 2/5/24, patient is not eligible for dig med

## 2024-08-01 ENCOUNTER — TELEPHONE (OUTPATIENT)
Dept: FAMILY MEDICINE | Facility: CLINIC | Age: 64
End: 2024-08-01
Payer: COMMERCIAL

## 2024-08-01 DIAGNOSIS — E11.9 TYPE 2 DIABETES MELLITUS WITHOUT COMPLICATION, WITHOUT LONG-TERM CURRENT USE OF INSULIN: Primary | ICD-10-CM

## 2024-08-01 NOTE — TELEPHONE ENCOUNTER
----- Message from Chantell Lu sent at 8/1/2024 12:52 PM CDT -----  States she needs to get a referral to see Dr Jo Ann Pan for diabetes at the [facility]. She would like a call back. Please call pt [phone]. Thank you

## 2024-08-01 NOTE — TELEPHONE ENCOUNTER
Patient called in stating she will need a referral to [facility] to see Dr. Jo Ann Pan for her diabetes.

## 2024-10-14 DIAGNOSIS — R03.0 ELEVATED BLOOD PRESSURE READING: ICD-10-CM

## 2024-10-14 NOTE — TELEPHONE ENCOUNTER
Refill Routing Note   Medication(s) are not appropriate for processing by [facility] for the following reason(s):        No active prescription written by provider  Patient not seen by provider within 15 months  ED/Hospital Visit since last OV with provider    [facility] action(s):  Defer      Medication Therapy Plan: LOV(3/26/2020)    Extended chart review required: Yes     Appointments  past 12m or future 3m with PCP    Date Provider   Last Visit   3/26/2020 Theresa Patel MD   Next Visit   1/13/2025 Theresa Patel MD   ED visits in past 90 days: 0        Note composed:4:18 PM 10/14/2024

## 2024-10-14 NOTE — TELEPHONE ENCOUNTER
No care due was identified.  [facility] Embedded Care Due Messages. Reference number: [id number].   10/14/2024 1:33:59 PM CDT

## 2024-10-15 RX ORDER — AMLODIPINE BESYLATE 5 MG/1
5 TABLET ORAL DAILY
Qty: 90 TABLET | Refills: 0 | Status: SHIPPED | OUTPATIENT
Start: 2024-10-15

## 2024-11-19 ENCOUNTER — PATIENT MESSAGE (OUTPATIENT)
Dept: NEUROLOGY | Facility: CLINIC | Age: 64
End: 2024-11-19
Payer: COMMERCIAL

## 2024-12-15 DIAGNOSIS — R03.0 ELEVATED BLOOD PRESSURE READING: ICD-10-CM

## 2024-12-15 NOTE — TELEPHONE ENCOUNTER
No care due was identified.  [facility] Embedded Care Due Messages. Reference number: [id number].   12/15/2024 2:34:18 PM CST

## 2024-12-22 RX ORDER — AMLODIPINE BESYLATE 5 MG/1
5 TABLET ORAL
Qty: 90 TABLET | Refills: 0 | Status: SHIPPED | OUTPATIENT
Start: 2024-12-22

## 2025-01-08 DIAGNOSIS — E11.9 TYPE 2 DIABETES MELLITUS WITHOUT COMPLICATION: ICD-10-CM

## 2025-01-13 ENCOUNTER — OFFICE VISIT (OUTPATIENT)
Dept: FAMILY MEDICINE | Facility: CLINIC | Age: 65
End: 2025-01-13
Attending: FAMILY MEDICINE
Payer: COMMERCIAL

## 2025-01-13 VITALS
WEIGHT: 146.19 LBS | SYSTOLIC BLOOD PRESSURE: 136 MMHG | DIASTOLIC BLOOD PRESSURE: 86 MMHG | HEART RATE: 92 BPM | TEMPERATURE: 97 F | OXYGEN SATURATION: 99 % | BODY MASS INDEX: 26.9 KG/M2 | RESPIRATION RATE: 18 BRPM | HEIGHT: 62 IN

## 2025-01-13 DIAGNOSIS — I10 HYPERTENSION, UNSPECIFIED TYPE: ICD-10-CM

## 2025-01-13 DIAGNOSIS — Z00.00 ANNUAL PHYSICAL EXAM: Primary | ICD-10-CM

## 2025-01-13 DIAGNOSIS — E66.3 OVERWEIGHT (BMI 25.0-29.9): ICD-10-CM

## 2025-01-13 DIAGNOSIS — F41.9 ANXIETY DISORDER, UNSPECIFIED TYPE: ICD-10-CM

## 2025-01-13 DIAGNOSIS — Z78.0 POSTMENOPAUSAL: ICD-10-CM

## 2025-01-13 DIAGNOSIS — E78.5 HYPERLIPIDEMIA, UNSPECIFIED HYPERLIPIDEMIA TYPE: ICD-10-CM

## 2025-01-13 DIAGNOSIS — Z23 NEED FOR PROPHYLACTIC VACCINATION AGAINST STREPTOCOCCUS PNEUMONIAE (PNEUMOCOCCUS): ICD-10-CM

## 2025-01-13 DIAGNOSIS — E11.9 TYPE 2 DIABETES MELLITUS WITHOUT COMPLICATION, WITHOUT LONG-TERM CURRENT USE OF INSULIN: ICD-10-CM

## 2025-01-13 PROCEDURE — 99999 PR PBB SHADOW E&M-EST. PATIENT-LVL V: CPT | Mod: PBBFAC,,, | Performed by: FAMILY MEDICINE

## 2025-01-13 PROCEDURE — 3079F DIAST BP 80-89 MM HG: CPT | Mod: CPTII,S$GLB,, | Performed by: FAMILY MEDICINE

## 2025-01-13 PROCEDURE — 3044F HG A1C LEVEL LT 7.0%: CPT | Mod: CPTII,S$GLB,, | Performed by: FAMILY MEDICINE

## 2025-01-13 PROCEDURE — 3066F NEPHROPATHY DOC TX: CPT | Mod: CPTII,S$GLB,, | Performed by: FAMILY MEDICINE

## 2025-01-13 PROCEDURE — 99396 PREV VISIT EST AGE 40-64: CPT | Mod: 25,S$GLB,, | Performed by: FAMILY MEDICINE

## 2025-01-13 PROCEDURE — 3075F SYST BP GE 130 - 139MM HG: CPT | Mod: CPTII,S$GLB,, | Performed by: FAMILY MEDICINE

## 2025-01-13 PROCEDURE — 90471 IMMUNIZATION ADMIN: CPT | Mod: S$GLB,,, | Performed by: FAMILY MEDICINE

## 2025-01-13 PROCEDURE — 3008F BODY MASS INDEX DOCD: CPT | Mod: CPTII,S$GLB,, | Performed by: FAMILY MEDICINE

## 2025-01-13 PROCEDURE — 1159F MED LIST DOCD IN RCRD: CPT | Mod: CPTII,S$GLB,, | Performed by: FAMILY MEDICINE

## 2025-01-13 PROCEDURE — 90677 PCV20 VACCINE IM: CPT | Mod: S$GLB,,, | Performed by: FAMILY MEDICINE

## 2025-01-13 PROCEDURE — 3061F NEG MICROALBUMINURIA REV: CPT | Mod: CPTII,S$GLB,, | Performed by: FAMILY MEDICINE

## 2025-01-13 RX ORDER — ROSUVASTATIN CALCIUM 10 MG/1
10 TABLET, COATED ORAL NIGHTLY
Qty: 90 TABLET | Refills: 0 | Status: SHIPPED | OUTPATIENT
Start: 2025-01-13

## 2025-01-13 NOTE — PROGRESS NOTES
HISTORY OF PRESENT ILLNESS: Ms. Rush comes in today for annual wellness examination. She is not fasting and has taken diabetes medication today.     END OF LIFE DECISION: She does not have a living will. She does desire life support.    Current Outpatient Medications   Medication Sig    amLODIPine (NORVASC) 5 MG tablet TAKE 1 TABLET(5 MG) BY MOUTH DAILY    blood sugar diagnostic Strp 1 each by Misc.(Non-Drug; Combo Route) route 4 (four) times daily.    blood-glucose meter,continuous Misc 1 each by Misc.(Non-Drug; Combo Route) route 4 (four) times daily.    lancets (LANCETS,THIN) Misc 1 each by Misc.(Non-Drug; Combo Route) route 4 (four) times daily.    metFORMIN (GLUCOPHAGE) 500 MG tablet Take 1 tablet (500 mg total) by mouth 2 (two) times daily with meals.    pregabalin (LYRICA) 50 MG capsule Take 50 mg by mouth 3 (three) times daily.    tiZANidine (ZANAFLEX) 4 MG tablet Take 4 mg by mouth every evening.    valACYclovir (VALTREX) 1000 MG tablet Take 1,000 mg by mouth 2 (two) times daily.    LIDODERM 5 % 1 patch. (Patient not taking: Reported on 1/13/2025)       SCREENINGS:    Cholesterol: February 5, 2024.  Colonoscopy: June 12, 2018 - okay; repeat in 10 years.   Mammogram:  March 9, 2023 - okay.  GYN Exam: March 8, 2023  pap smear, HPV with GYN - okay.  Dexa Scan: Never.   Eye Exam: Years ago per patient.  She wears reading glasses.  Dental Exam: 2024 per patient.  PPD: Negative in the past.  HCV AB: Reports tested in the past and does not desire again.  HIVAB: In the past but desires again.      Immunizations:                     Flu shot: In the past. She declines.  Pneumovax: Appx. 17 years ago per patient.  Prevnar: Never. She desires.  Zostavax:  Never. Reports no history of varicella or zoster.  Shingrix: Never. Reports no history of varicella or zoster.  RSV: Never. Advised patient available at local pharmacy.  Immunization History   Administered Date(s) Administered    COVID-19, MRNA, LN-S, PF (Pfizer)  (Purple Cap) 04/14/2021, 05/05/2021    Tdap 12/14/2016                               ROS:  GENERAL: Denies fever, chills, fatigue or unusual weight change. Appetite normal. Exercises with yoga 2 times a week, 20 minutes each time for 2 weeks now.. Monitors diet does.  SKIN: Denies rashes, itching, changes in mole, color or texture of skin or easy bruising.  HEAD:  Denies recent head trauma but reports occasional headaches.  EYES: Denies change in vision, pain, diplopia, redness or discharge.  EARS: Denies ear pain, discharge, vertigo but reports decreased hearing.  NOSE: Denies loss of smell, epistaxis or rhinitis.  MOUTH & THROAT: Denies hoarseness or change in voice. Denies excessive gum bleeding or mouth sores.  Denies sore throat.  NODES: Denies swollen glands.  CHEST: Denies MCCANN, wheezing, cough, or sputum production.  CARDIOVASCULAR: Denies chest pain, PND, orthopnea, palpitation or reduced exercise tolerance.  Reports performs home blood pressure checks daily with levels ranging 120s/80s.  ABDOMEN: Denies diarrhea, nausea, vomiting, abdominal pain, or blood in stool. Reports chronic constipation but improved at this time.    URINARY: Denies flank pain, dysuria or hematuria.  GENITOURINARY: Denies flank pain, dysuria, frequency or hematuria. No change in menses. Performs monthly breast exams does.  ENDOCRINE: Denies thyroid problems. Performs home glucose checks every morning with levels ranging 's and every night with levels ranging 's. Desires diabetes education. Reports never prescribed statin in the past..  HEME/LYMPH: Denies bleeding problems.  PERIPHERAL VASCULAR:Denies claudication or cyanosis.  MUSCULOSKELETAL:  Denies joint pains, stiffness or swelling. Denies edema.  NEUROLOGIC: Denies history of seizures, tremors, paralysis, alteration of gait or coordination.  PSYCHIATRIC: Denies mood swings, depression, homicidal or suicidal thoughts. Denies sleep problems. Reports chronic anxiety but  "controlled without medication.    PE:   VS: /86 (BP Location: Left arm, Patient Position: Sitting)   Pulse 92 Comment: Rechecked by Dr. Patel.  Temp 96.8 °F (36 °C) (Tympanic)   Resp 18   Ht 5' 2" (1.575 m)   Wt 66.3 kg (146 lb 2.6 oz)   SpO2 99%   BMI 26.73 kg/m²   APPEARANCE:  Well nourished, well developed female, pleasant and overweight, alert and oriented in no acute distress.    HEAD: Nontender. Full range of motion.  EYES: PERRL, conjunctiva pink, lids no edema.   EARS: External canal patent, no swelling or redness. TM's shiny and clear.  NOSE: Mucosa and turbinates pink, not swollen. No discharge. Nontender sinuses.  THROAT: No pharyngeal erythema or exudate. No stridor.   NECK: Supple, no mass, thyroid not enlarged.  NODES: No cervical lymph node enlargement.  CHEST: Normal respiratory effort. Lungs clear to auscultation.  CARDIOVASCULAR: Normal S1, S2. No rubs, murmurs or gallops. PMI not displaced. No carotid bruit. Pedal pulses palpable bilaterally. No edema.  ABDOMEN: Bowel sounds present. Not distended. Soft. No tenderness, masses or organomegaly.  BREAST EXAM: Not examined as deferred to GYN.  PELVIC EXAM: Not examined as deferred to GYN.  RECTAL EXAM: Not examined.  MUSCULOSKELETAL: No joint deformities or stiffness. She is ambulatory without problems.  SKIN: No rashes or suspicious lesions, normal color and turgor.  NEUROLOGIC:   Cranial Nerves: II-XII grossly intact.   DTR's: Knees, Ankles 2+ and equal bilaterally. Gait & Posture: Normal gait and fine motion.  PSYCHIATRIC:Patient alert, oriented x 3. Mood/Affect normal without acute anxiety or depression noted. Judgment/insight good as she makes appropriate decisions during today's exam.    Protective Sensation (w/ 10 gram monofilament):  Right: Intact  Left: Intact    Visual Inspection:  Normal -  Bilateral    Pedal Pulses:   Right: Present  Left: Present    Posterior Tibialis Pulses:   Right:Present  Left: Present     ASSESSMENT:    " ICD-10-CM ICD-9-CM    1. Annual physical exam  Z00.00 V70.0 Microalbumin/Creatinine Ratio, Urine      Hemoglobin A1C      TSH      Comprehensive Metabolic Panel      Lipid Panel      CBC Auto Differential      HIV 1/2 Ag/Ab (4th Gen)      2. Type 2 diabetes mellitus without complication, without long-term current use of insulin  E11.9 250.00 Ambulatory referral/consult to Optometry      Ambulatory referral/consult to Diabetes Education      3. Hypertension, unspecified type  I10 401.9       4. Hyperlipidemia, unspecified hyperlipidemia type  E78.5 272.4 rosuvastatin (CRESTOR) 10 MG tablet      5. Anxiety disorder, unspecified type  F41.9 300.00       6. Overweight (BMI 25.0-29.9)  E66.3 278.02       7. Postmenopausal  Z78.0 V49.81       8. Need for prophylactic vaccination against Streptococcus pneumoniae (pneumococcus)  Z23 V03.82 pneumoc 20-sonia conj-dip cr(PF) (PREVNAR-20 (PF)) injection Syrg 0.5 mL        PLAN:  1. Age-appropriate counseling-appropriate low-sodium, low-cholesterol, low carbohydrate diet and exercise daily, monthly breast self exam, annual wellness examination.   2. Patient advised to call for results.  3. Add Crestor 10 mg nightly for treatment of high cholesterol and for diabetic heart prevention with statin therapy; medication precautions discussed with patient.  4. Annual eye and dental examination advised.    5. Keep follow up with specialists (including patient stated she will call to schedule annual gyn wellness with GYN).  6. Follow up in about 13 weeks (around 4/14/2025) for statin and diabetes follow up.

## 2025-02-03 ENCOUNTER — LAB VISIT (OUTPATIENT)
Dept: LAB | Facility: HOSPITAL | Age: 65
End: 2025-02-03
Attending: FAMILY MEDICINE
Payer: COMMERCIAL

## 2025-02-03 DIAGNOSIS — Z00.00 ANNUAL PHYSICAL EXAM: ICD-10-CM

## 2025-02-03 DIAGNOSIS — E11.9 TYPE 2 DIABETES MELLITUS WITHOUT COMPLICATION: ICD-10-CM

## 2025-02-03 LAB
ALBUMIN SERPL BCP-MCNC: 3.9 G/DL (ref 3.5–5.2)
ALBUMIN/CREAT UR: 7 UG/MG (ref 0–30)
ALP SERPL-CCNC: 89 U/L (ref 40–150)
ALT SERPL W/O P-5'-P-CCNC: 9 U/L (ref 10–44)
ANION GAP SERPL CALC-SCNC: 11 MMOL/L (ref 8–16)
AST SERPL-CCNC: 16 U/L (ref 10–40)
BILIRUB SERPL-MCNC: 0.3 MG/DL (ref 0.1–1)
BUN SERPL-MCNC: 18 MG/DL (ref 8–23)
CALCIUM SERPL-MCNC: 9.9 MG/DL (ref 8.7–10.5)
CHLORIDE SERPL-SCNC: 108 MMOL/L (ref 95–110)
CHOLEST SERPL-MCNC: 250 MG/DL (ref 120–199)
CHOLEST/HDLC SERPL: 3.3 {RATIO} (ref 2–5)
CO2 SERPL-SCNC: 23 MMOL/L (ref 23–29)
CREAT SERPL-MCNC: 1 MG/DL (ref 0.5–1.4)
CREAT UR-MCNC: 285 MG/DL (ref 15–325)
EST. GFR  (NO RACE VARIABLE): >60 ML/MIN/1.73 M^2
ESTIMATED AVG GLUCOSE: 120 MG/DL (ref 68–131)
GLUCOSE SERPL-MCNC: 97 MG/DL (ref 70–110)
HBA1C MFR BLD: 5.8 % (ref 4–5.6)
HDLC SERPL-MCNC: 76 MG/DL (ref 40–75)
HDLC SERPL: 30.4 % (ref 20–50)
HIV 1+2 AB+HIV1 P24 AG SERPL QL IA: NORMAL
LDLC SERPL CALC-MCNC: 156 MG/DL (ref 63–159)
MICROALBUMIN UR DL<=1MG/L-MCNC: 20 UG/ML
NONHDLC SERPL-MCNC: 174 MG/DL
POTASSIUM SERPL-SCNC: 4.4 MMOL/L (ref 3.5–5.1)
PROT SERPL-MCNC: 8.1 G/DL (ref 6–8.4)
SODIUM SERPL-SCNC: 142 MMOL/L (ref 136–145)
TRIGL SERPL-MCNC: 90 MG/DL (ref 30–150)
TSH SERPL DL<=0.005 MIU/L-ACNC: 1.42 UIU/ML (ref 0.4–4)

## 2025-02-03 PROCEDURE — 84443 ASSAY THYROID STIM HORMONE: CPT | Performed by: FAMILY MEDICINE

## 2025-02-03 PROCEDURE — 85025 COMPLETE CBC W/AUTO DIFF WBC: CPT | Performed by: FAMILY MEDICINE

## 2025-02-03 PROCEDURE — 82043 UR ALBUMIN QUANTITATIVE: CPT | Performed by: FAMILY MEDICINE

## 2025-02-03 PROCEDURE — 36415 COLL VENOUS BLD VENIPUNCTURE: CPT | Performed by: FAMILY MEDICINE

## 2025-02-03 PROCEDURE — 83036 HEMOGLOBIN GLYCOSYLATED A1C: CPT | Performed by: FAMILY MEDICINE

## 2025-02-03 PROCEDURE — 80053 COMPREHEN METABOLIC PANEL: CPT | Performed by: FAMILY MEDICINE

## 2025-02-03 PROCEDURE — 87389 HIV-1 AG W/HIV-1&-2 AB AG IA: CPT | Performed by: FAMILY MEDICINE

## 2025-02-03 PROCEDURE — 80061 LIPID PANEL: CPT | Performed by: FAMILY MEDICINE

## 2025-02-04 LAB
BASOPHILS # BLD AUTO: 0.04 K/UL (ref 0–0.2)
BASOPHILS NFR BLD: 0.9 % (ref 0–1.9)
DIFFERENTIAL METHOD BLD: ABNORMAL
EOSINOPHIL # BLD AUTO: 0.1 K/UL (ref 0–0.5)
EOSINOPHIL NFR BLD: 2.8 % (ref 0–8)
ERYTHROCYTE [DISTWIDTH] IN BLOOD BY AUTOMATED COUNT: 13.2 % (ref 11.5–14.5)
HCT VFR BLD AUTO: 42.9 % (ref 37–48.5)
HGB BLD-MCNC: 12.7 G/DL (ref 12–16)
IMM GRANULOCYTES # BLD AUTO: 0.01 K/UL (ref 0–0.04)
IMM GRANULOCYTES NFR BLD AUTO: 0.2 % (ref 0–0.5)
LYMPHOCYTES # BLD AUTO: 1.5 K/UL (ref 1–4.8)
LYMPHOCYTES NFR BLD: 34.5 % (ref 18–48)
MCH RBC QN AUTO: 26.3 PG (ref 27–31)
MCHC RBC AUTO-ENTMCNC: 29.6 G/DL (ref 32–36)
MCV RBC AUTO: 89 FL (ref 82–98)
MONOCYTES # BLD AUTO: 0.3 K/UL (ref 0.3–1)
MONOCYTES NFR BLD: 7.6 % (ref 4–15)
NEUTROPHILS # BLD AUTO: 2.4 K/UL (ref 1.8–7.7)
NEUTROPHILS NFR BLD: 54 % (ref 38–73)
NRBC BLD-RTO: 0 /100 WBC
PLATELET # BLD AUTO: 432 K/UL (ref 150–450)
PMV BLD AUTO: 9.7 FL (ref 9.2–12.9)
RBC # BLD AUTO: 4.83 M/UL (ref 4–5.4)
WBC # BLD AUTO: 4.35 K/UL (ref 3.9–12.7)

## 2025-03-20 DIAGNOSIS — R03.0 ELEVATED BLOOD PRESSURE READING: ICD-10-CM

## 2025-03-20 RX ORDER — AMLODIPINE BESYLATE 5 MG/1
5 TABLET ORAL
Qty: 90 TABLET | Refills: 3 | Status: SHIPPED | OUTPATIENT
Start: 2025-03-20

## 2025-03-20 NOTE — TELEPHONE ENCOUNTER
No care due was identified.  [facility] Embedded Care Due Messages. Reference number: [id number].   3/20/2025 3:16:45 AM CDT

## 2025-03-20 NOTE — TELEPHONE ENCOUNTER
Refill Decision Note   Jenni Rush  is requesting a refill authorization.  Brief Assessment and Rationale for Refill:  Approve     Medication Therapy Plan:         Comments:     Note composed:8:02 AM 03/20/2025

## 2025-04-11 DIAGNOSIS — E78.5 HYPERLIPIDEMIA, UNSPECIFIED HYPERLIPIDEMIA TYPE: ICD-10-CM

## 2025-04-11 RX ORDER — ROSUVASTATIN CALCIUM 10 MG/1
10 TABLET, COATED ORAL NIGHTLY
Qty: 90 TABLET | Refills: 3 | Status: SHIPPED | OUTPATIENT
Start: 2025-04-11

## 2025-04-11 NOTE — TELEPHONE ENCOUNTER
Refill Routing Note   Medication(s) are not appropriate for processing by [facility] for the following reason(s):        New or recently adjusted medication    [facility] action(s):  Defer               Appointments  past 12m or future 3m with PCP    Date Provider   Last Visit   1/13/2025 Theresa Patel MD   Next Visit   4/14/2025 Theresa Patel MD   ED visits in past 90 days: 0        Note composed:5:35 AM 04/11/2025

## 2025-04-11 NOTE — TELEPHONE ENCOUNTER
No care due was identified.  [facility] Embedded Care Due Messages. Reference number: [id number].   4/11/2025 3:16:40 AM CDT

## 2025-04-14 ENCOUNTER — PATIENT MESSAGE (OUTPATIENT)
Dept: ADMINISTRATIVE | Facility: HOSPITAL | Age: 65
End: 2025-04-14
Payer: COMMERCIAL

## 2025-04-14 ENCOUNTER — PATIENT OUTREACH (OUTPATIENT)
Dept: ADMINISTRATIVE | Facility: HOSPITAL | Age: 65
End: 2025-04-14
Payer: COMMERCIAL

## 2025-04-28 ENCOUNTER — PATIENT OUTREACH (OUTPATIENT)
Dept: ADMINISTRATIVE | Facility: HOSPITAL | Age: 65
End: 2025-04-28
Payer: COMMERCIAL

## 2025-05-01 ENCOUNTER — TELEPHONE (OUTPATIENT)
Dept: PHARMACY | Facility: CLINIC | Age: 65
End: 2025-05-01
Payer: COMMERCIAL

## 2025-05-01 NOTE — TELEPHONE ENCOUNTER
[facility]/Population Health Chart Review & Patient Outreach Details For Medication Adherence Project    Reason for Outreach Encounter: 3rd Party payor non-compliance report ([employer], [employer], C, etc)  2.  Patient Outreach Method: Reviewed patient chart   3.   Medication in question:    Diabetes Medications              metFORMIN (GLUCOPHAGE) 500 MG tablet Take 1 tablet (500 mg total) by mouth 2 (two) times daily with meals.                 metformin  last filled  3/30 for 90 day supply      4.  Reviewed and or Updates Made To: Patient Chart  5. Outreach Outcomes and/or actions taken: Patient filled medication and is on track to be adherent  Additional Notes:

## 2025-05-23 ENCOUNTER — PATIENT OUTREACH (OUTPATIENT)
Dept: ADMINISTRATIVE | Facility: HOSPITAL | Age: 65
End: 2025-05-23
Payer: COMMERCIAL

## 2025-05-26 ENCOUNTER — PATIENT OUTREACH (OUTPATIENT)
Dept: ADMINISTRATIVE | Facility: HOSPITAL | Age: 65
End: 2025-05-26
Payer: COMMERCIAL

## 2025-06-25 DIAGNOSIS — E11.9 TYPE 2 DIABETES MELLITUS WITHOUT COMPLICATION, WITHOUT LONG-TERM CURRENT USE OF INSULIN: ICD-10-CM

## 2025-06-25 RX ORDER — METFORMIN HYDROCHLORIDE 500 MG/1
500 TABLET ORAL 2 TIMES DAILY WITH MEALS
Qty: 60 TABLET | Refills: 0 | Status: SHIPPED | OUTPATIENT
Start: 2025-06-25

## 2025-06-25 RX ORDER — METFORMIN HYDROCHLORIDE 500 MG/1
500 TABLET ORAL 2 TIMES DAILY WITH MEALS
Qty: 180 TABLET | OUTPATIENT
Start: 2025-06-25

## 2025-06-25 NOTE — TELEPHONE ENCOUNTER
Provider Staff:  Action required for this patient    Requires labs      Please see care gap opportunities below in Care Due Message.    Thanks!  [facility]     Appointments      Date Provider   Last Visit   1/13/2025 Theresa Patel MD   Next Visit   Visit date not found Theresa Patel MD     Refill Decision Note   Jenni Rush  is requesting a refill authorization.  Brief Assessment and Rationale for Refill:  Quick Discontinue     Medication Therapy Plan: E-Prescribing Status: Receipt confirmed by pharmacy (6/25/2025  9:48 AM CDT)      Comments:     Note composed:11:27 AM 06/25/2025

## 2025-06-25 NOTE — TELEPHONE ENCOUNTER
No care due was identified.  [facility] Embedded Care Due Messages. Reference number: [id number].   6/25/2025 8:33:05 AM CDT

## 2025-06-25 NOTE — TELEPHONE ENCOUNTER
Care Due:                  Date            Visit Type   Department     Provider  --------------------------------------------------------------------------------                                EP -                              PRIMARY      [facility]  Last Visit: 01-      CARE ([facility])   MEDICINE       Theresa Patel  Next Visit: None Scheduled  None         None Found                                                            Last  Test          Frequency    Reason                     Performed    Due Date  --------------------------------------------------------------------------------    HBA1C.......  6 months...  metFORMIN................  02- 08-    [facility] Embedded Care Due Messages. Reference number: [id number].   6/25/2025 11:20:55 AM CDT

## 2025-06-26 ENCOUNTER — TELEPHONE (OUTPATIENT)
Dept: FAMILY MEDICINE | Facility: CLINIC | Age: 65
End: 2025-06-26
Payer: COMMERCIAL

## 2025-07-11 ENCOUNTER — TELEPHONE (OUTPATIENT)
Dept: PHARMACY | Facility: CLINIC | Age: 65
End: 2025-07-11
Payer: COMMERCIAL

## 2025-07-11 NOTE — TELEPHONE ENCOUNTER
[facility]/Population Health Chart Review & Patient Outreach Details For Medication Adherence Project    Reason for Outreach Encounter: 3rd Party payor non-compliance report ([employer], [employer], C, etc)  2.  Patient Outreach Method: [employer] System[employer] message  3.   Medication in question: metformin   LAST FILLED: 3/30/25 for 90 day supply  Diabetes Medications              metFORMIN (GLUCOPHAGE) 500 MG tablet Take 1 tablet (500 mg total) by mouth 2 (two) times daily with meals.              4.  Reviewed and or Updates Made To: Patient Chart  5. Outreach Outcomes and/or actions taken: Sent inquiry to patient: Waiting for response.

## 2025-08-13 ENCOUNTER — PATIENT MESSAGE (OUTPATIENT)
Dept: ADMINISTRATIVE | Facility: HOSPITAL | Age: 65
End: 2025-08-13
Payer: COMMERCIAL

## 2025-08-13 DIAGNOSIS — E11.9 TYPE 2 DIABETES MELLITUS WITHOUT COMPLICATION: ICD-10-CM

## 2025-08-19 ENCOUNTER — PATIENT MESSAGE (OUTPATIENT)
Dept: ADMINISTRATIVE | Facility: HOSPITAL | Age: 65
End: 2025-08-19
Payer: COMMERCIAL